# Patient Record
Sex: FEMALE | Race: BLACK OR AFRICAN AMERICAN | NOT HISPANIC OR LATINO | Employment: PART TIME | URBAN - METROPOLITAN AREA
[De-identification: names, ages, dates, MRNs, and addresses within clinical notes are randomized per-mention and may not be internally consistent; named-entity substitution may affect disease eponyms.]

---

## 2023-09-14 ENCOUNTER — HOSPITAL ENCOUNTER (EMERGENCY)
Facility: HOSPITAL | Age: 68
Discharge: HOME | End: 2023-09-14
Attending: EMERGENCY MEDICINE
Payer: COMMERCIAL

## 2023-09-14 VITALS
WEIGHT: 177 LBS | HEART RATE: 82 BPM | HEIGHT: 60 IN | SYSTOLIC BLOOD PRESSURE: 176 MMHG | RESPIRATION RATE: 18 BRPM | DIASTOLIC BLOOD PRESSURE: 105 MMHG | BODY MASS INDEX: 34.75 KG/M2 | TEMPERATURE: 98.4 F | OXYGEN SATURATION: 97 %

## 2023-09-14 DIAGNOSIS — T14.8XXA ABRASION: Primary | ICD-10-CM

## 2023-09-14 DIAGNOSIS — S13.9XXA NECK SPRAIN, INITIAL ENCOUNTER: ICD-10-CM

## 2023-09-14 PROCEDURE — 99283 EMERGENCY DEPT VISIT LOW MDM: CPT

## 2023-09-14 RX ORDER — ACETAMINOPHEN 325 MG/1
650 TABLET ORAL ONCE
Status: COMPLETED | OUTPATIENT
Start: 2023-09-14 | End: 2023-09-14

## 2023-09-14 RX ADMIN — ACETAMINOPHEN 650 MG: 325 TABLET, FILM COATED ORAL at 17:05
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
## 2024-02-18 ENCOUNTER — APPOINTMENT (OUTPATIENT)
Dept: GENERAL RADIOLOGY | Facility: HOSPITAL | Age: 69
End: 2024-02-18
Payer: MEDICARE

## 2024-02-18 ENCOUNTER — HOSPITAL ENCOUNTER (OUTPATIENT)
Facility: HOSPITAL | Age: 69
Setting detail: OBSERVATION
Discharge: HOME | End: 2024-02-21
Attending: EMERGENCY MEDICINE | Admitting: INTERNAL MEDICINE
Payer: MEDICARE

## 2024-02-18 DIAGNOSIS — U07.1 COVID-19 VIRUS INFECTION: Primary | ICD-10-CM

## 2024-02-18 DIAGNOSIS — J18.9 PNEUMONIA OF LEFT LOWER LOBE DUE TO INFECTIOUS ORGANISM: ICD-10-CM

## 2024-02-18 DIAGNOSIS — R53.1 GENERALIZED WEAKNESS: ICD-10-CM

## 2024-02-18 DIAGNOSIS — R26.2 UNABLE TO AMBULATE: ICD-10-CM

## 2024-02-18 LAB
ALBUMIN SERPL-MCNC: 3.6 G/DL (ref 3.5–5.2)
ALBUMIN/GLOB SERPL: 1 G/DL
ALP SERPL-CCNC: 116 U/L (ref 39–117)
ALT SERPL W P-5'-P-CCNC: 10 U/L (ref 1–33)
ANION GAP SERPL CALCULATED.3IONS-SCNC: 10 MMOL/L (ref 5–15)
AST SERPL-CCNC: 18 U/L (ref 1–32)
BASOPHILS # BLD AUTO: 0.02 10*3/MM3 (ref 0–0.2)
BASOPHILS NFR BLD AUTO: 0.3 % (ref 0–1.5)
BILIRUB SERPL-MCNC: 0.2 MG/DL (ref 0–1.2)
BUN SERPL-MCNC: 12 MG/DL (ref 8–23)
BUN/CREAT SERPL: 15.8 (ref 7–25)
CALCIUM SPEC-SCNC: 8.9 MG/DL (ref 8.6–10.5)
CHLORIDE SERPL-SCNC: 104 MMOL/L (ref 98–107)
CO2 SERPL-SCNC: 23 MMOL/L (ref 22–29)
CREAT SERPL-MCNC: 0.76 MG/DL (ref 0.57–1)
D-LACTATE SERPL-SCNC: 1.4 MMOL/L (ref 0.5–2)
DEPRECATED RDW RBC AUTO: 43.8 FL (ref 37–54)
EGFRCR SERPLBLD CKD-EPI 2021: 84.4 ML/MIN/1.73
EOSINOPHIL # BLD AUTO: 0.01 10*3/MM3 (ref 0–0.4)
EOSINOPHIL NFR BLD AUTO: 0.2 % (ref 0.3–6.2)
ERYTHROCYTE [DISTWIDTH] IN BLOOD BY AUTOMATED COUNT: 13.1 % (ref 12.3–15.4)
FLUAV RNA RESP QL NAA+PROBE: NOT DETECTED
FLUBV RNA RESP QL NAA+PROBE: NOT DETECTED
GLOBULIN UR ELPH-MCNC: 3.6 GM/DL
GLUCOSE SERPL-MCNC: 137 MG/DL (ref 65–99)
HCT VFR BLD AUTO: 41.9 % (ref 34–46.6)
HGB BLD-MCNC: 13.2 G/DL (ref 12–15.9)
HOLD SPECIMEN: NORMAL
HOLD SPECIMEN: NORMAL
IMM GRANULOCYTES # BLD AUTO: 0.01 10*3/MM3 (ref 0–0.05)
IMM GRANULOCYTES NFR BLD AUTO: 0.2 % (ref 0–0.5)
LYMPHOCYTES # BLD AUTO: 0.72 10*3/MM3 (ref 0.7–3.1)
LYMPHOCYTES NFR BLD AUTO: 12.3 % (ref 19.6–45.3)
MAGNESIUM SERPL-MCNC: 1.8 MG/DL (ref 1.6–2.4)
MCH RBC QN AUTO: 28.8 PG (ref 26.6–33)
MCHC RBC AUTO-ENTMCNC: 31.5 G/DL (ref 31.5–35.7)
MCV RBC AUTO: 91.3 FL (ref 79–97)
MONOCYTES # BLD AUTO: 0.95 10*3/MM3 (ref 0.1–0.9)
MONOCYTES NFR BLD AUTO: 16.3 % (ref 5–12)
NEUTROPHILS NFR BLD AUTO: 4.12 10*3/MM3 (ref 1.7–7)
NEUTROPHILS NFR BLD AUTO: 70.7 % (ref 42.7–76)
NRBC BLD AUTO-RTO: 0 /100 WBC (ref 0–0.2)
PLATELET # BLD AUTO: 258 10*3/MM3 (ref 140–450)
PMV BLD AUTO: 9.8 FL (ref 6–12)
POTASSIUM SERPL-SCNC: 3.9 MMOL/L (ref 3.5–5.2)
PROCALCITONIN SERPL-MCNC: 0.05 NG/ML (ref 0–0.25)
PROT SERPL-MCNC: 7.2 G/DL (ref 6–8.5)
RBC # BLD AUTO: 4.59 10*6/MM3 (ref 3.77–5.28)
SARS-COV-2 RNA RESP QL NAA+PROBE: DETECTED
SODIUM SERPL-SCNC: 137 MMOL/L (ref 136–145)
TROPONIN T SERPL HS-MCNC: 9 NG/L
WBC NRBC COR # BLD AUTO: 5.83 10*3/MM3 (ref 3.4–10.8)
WHOLE BLOOD HOLD COAG: NORMAL
WHOLE BLOOD HOLD SPECIMEN: NORMAL

## 2024-02-18 PROCEDURE — 84145 PROCALCITONIN (PCT): CPT | Performed by: EMERGENCY MEDICINE

## 2024-02-18 PROCEDURE — 25010000002 CEFTRIAXONE PER 250 MG: Performed by: EMERGENCY MEDICINE

## 2024-02-18 PROCEDURE — 96365 THER/PROPH/DIAG IV INF INIT: CPT

## 2024-02-18 PROCEDURE — 71045 X-RAY EXAM CHEST 1 VIEW: CPT

## 2024-02-18 PROCEDURE — 83735 ASSAY OF MAGNESIUM: CPT

## 2024-02-18 PROCEDURE — 87086 URINE CULTURE/COLONY COUNT: CPT | Performed by: NURSE PRACTITIONER

## 2024-02-18 PROCEDURE — 36415 COLL VENOUS BLD VENIPUNCTURE: CPT

## 2024-02-18 PROCEDURE — 87636 SARSCOV2 & INF A&B AMP PRB: CPT | Performed by: EMERGENCY MEDICINE

## 2024-02-18 PROCEDURE — 84484 ASSAY OF TROPONIN QUANT: CPT

## 2024-02-18 PROCEDURE — 85025 COMPLETE CBC W/AUTO DIFF WBC: CPT

## 2024-02-18 PROCEDURE — 99285 EMERGENCY DEPT VISIT HI MDM: CPT

## 2024-02-18 PROCEDURE — 80053 COMPREHEN METABOLIC PANEL: CPT

## 2024-02-18 PROCEDURE — 81001 URINALYSIS AUTO W/SCOPE: CPT

## 2024-02-18 PROCEDURE — 83605 ASSAY OF LACTIC ACID: CPT | Performed by: EMERGENCY MEDICINE

## 2024-02-18 PROCEDURE — 87040 BLOOD CULTURE FOR BACTERIA: CPT | Performed by: EMERGENCY MEDICINE

## 2024-02-18 PROCEDURE — 93005 ELECTROCARDIOGRAM TRACING: CPT

## 2024-02-18 RX ORDER — ACETAMINOPHEN 500 MG
1000 TABLET ORAL ONCE
Status: COMPLETED | OUTPATIENT
Start: 2024-02-18 | End: 2024-02-18

## 2024-02-18 RX ORDER — SODIUM CHLORIDE 0.9 % (FLUSH) 0.9 %
10 SYRINGE (ML) INJECTION AS NEEDED
Status: DISCONTINUED | OUTPATIENT
Start: 2024-02-18 | End: 2024-02-21 | Stop reason: HOSPADM

## 2024-02-18 RX ADMIN — ACETAMINOPHEN 1000 MG: 500 TABLET ORAL at 23:16

## 2024-02-18 RX ADMIN — CEFTRIAXONE 2000 MG: 2 INJECTION, POWDER, FOR SOLUTION INTRAMUSCULAR; INTRAVENOUS at 23:16

## 2024-02-18 NOTE — Clinical Note
Level of Care: Telemetry [5]   Diagnosis: COVID-19 [[id number]]   Admitting Physician: KIRAN ROSENTHAL III [[id number]]   Attending Physician: KIRAN ROSENTHAL III [[id number]]   Bed Request Comments: tele obs (not CDU)

## 2024-02-19 PROBLEM — E11.9 DMII (DIABETES MELLITUS, TYPE 2): Status: ACTIVE | Noted: 2024-02-19

## 2024-02-19 PROBLEM — I10 HTN (HYPERTENSION): Status: ACTIVE | Noted: 2024-02-19

## 2024-02-19 PROBLEM — U07.1 COVID-19: Status: ACTIVE | Noted: 2024-02-19

## 2024-02-19 LAB
ALBUMIN SERPL-MCNC: 3.6 G/DL (ref 3.5–5.2)
ALBUMIN/GLOB SERPL: 1.1 G/DL
ALP SERPL-CCNC: 111 U/L (ref 39–117)
ALT SERPL W P-5'-P-CCNC: 8 U/L (ref 1–33)
ANION GAP SERPL CALCULATED.3IONS-SCNC: 10 MMOL/L (ref 5–15)
AST SERPL-CCNC: 13 U/L (ref 1–32)
BACTERIA UR QL AUTO: ABNORMAL /HPF
BASOPHILS # BLD AUTO: 0.02 10*3/MM3 (ref 0–0.2)
BASOPHILS NFR BLD AUTO: 0.4 % (ref 0–1.5)
BILIRUB SERPL-MCNC: 0.2 MG/DL (ref 0–1.2)
BILIRUB UR QL STRIP: NEGATIVE
BUN SERPL-MCNC: 10 MG/DL (ref 8–23)
BUN/CREAT SERPL: 12 (ref 7–25)
CALCIUM SPEC-SCNC: 8.8 MG/DL (ref 8.6–10.5)
CHLORIDE SERPL-SCNC: 104 MMOL/L (ref 98–107)
CLARITY UR: ABNORMAL
CO2 SERPL-SCNC: 22 MMOL/L (ref 22–29)
COLOR UR: YELLOW
CREAT SERPL-MCNC: 0.83 MG/DL (ref 0.57–1)
CRP SERPL-MCNC: 1.83 MG/DL (ref 0–0.5)
D DIMER PPP FEU-MCNC: 0.55 MCGFEU/ML (ref 0–0.7)
DEPRECATED RDW RBC AUTO: 44.6 FL (ref 37–54)
EGFRCR SERPLBLD CKD-EPI 2021: 75.9 ML/MIN/1.73
EOSINOPHIL # BLD AUTO: 0 10*3/MM3 (ref 0–0.4)
EOSINOPHIL NFR BLD AUTO: 0 % (ref 0.3–6.2)
ERYTHROCYTE [DISTWIDTH] IN BLOOD BY AUTOMATED COUNT: 13.2 % (ref 12.3–15.4)
FERRITIN SERPL-MCNC: 67.13 NG/ML (ref 13–150)
GLOBULIN UR ELPH-MCNC: 3.3 GM/DL
GLUCOSE BLDC GLUCOMTR-MCNC: 109 MG/DL (ref 70–130)
GLUCOSE BLDC GLUCOMTR-MCNC: 120 MG/DL (ref 70–130)
GLUCOSE BLDC GLUCOMTR-MCNC: 152 MG/DL (ref 70–130)
GLUCOSE BLDC GLUCOMTR-MCNC: 153 MG/DL (ref 70–130)
GLUCOSE SERPL-MCNC: 112 MG/DL (ref 65–99)
GLUCOSE UR STRIP-MCNC: NEGATIVE MG/DL
HCT VFR BLD AUTO: 39.8 % (ref 34–46.6)
HGB BLD-MCNC: 13.2 G/DL (ref 12–15.9)
HGB UR QL STRIP.AUTO: ABNORMAL
HOLD SPECIMEN: NORMAL
HYALINE CASTS UR QL AUTO: ABNORMAL /LPF
IMM GRANULOCYTES # BLD AUTO: 0.01 10*3/MM3 (ref 0–0.05)
IMM GRANULOCYTES NFR BLD AUTO: 0.2 % (ref 0–0.5)
KETONES UR QL STRIP: NEGATIVE
LDH SERPL-CCNC: 162 U/L (ref 135–214)
LEUKOCYTE ESTERASE UR QL STRIP.AUTO: ABNORMAL
LYMPHOCYTES # BLD AUTO: 1.32 10*3/MM3 (ref 0.7–3.1)
LYMPHOCYTES NFR BLD AUTO: 28.3 % (ref 19.6–45.3)
MAGNESIUM SERPL-MCNC: 2 MG/DL (ref 1.6–2.4)
MCH RBC QN AUTO: 30.3 PG (ref 26.6–33)
MCHC RBC AUTO-ENTMCNC: 33.2 G/DL (ref 31.5–35.7)
MCV RBC AUTO: 91.5 FL (ref 79–97)
MONOCYTES # BLD AUTO: 0.96 10*3/MM3 (ref 0.1–0.9)
MONOCYTES NFR BLD AUTO: 20.6 % (ref 5–12)
NEUTROPHILS NFR BLD AUTO: 2.35 10*3/MM3 (ref 1.7–7)
NEUTROPHILS NFR BLD AUTO: 50.5 % (ref 42.7–76)
NITRITE UR QL STRIP: NEGATIVE
NRBC BLD AUTO-RTO: 0 /100 WBC (ref 0–0.2)
PH UR STRIP.AUTO: 5.5 [PH] (ref 5–8)
PLAT MORPH BLD: NORMAL
PLATELET # BLD AUTO: 237 10*3/MM3 (ref 140–450)
PMV BLD AUTO: 10.3 FL (ref 6–12)
POTASSIUM SERPL-SCNC: 3.8 MMOL/L (ref 3.5–5.2)
PROT SERPL-MCNC: 6.9 G/DL (ref 6–8.5)
PROT UR QL STRIP: NEGATIVE
RBC # BLD AUTO: 4.35 10*6/MM3 (ref 3.77–5.28)
RBC # UR STRIP: ABNORMAL /HPF
RBC MORPH BLD: NORMAL
REF LAB TEST METHOD: ABNORMAL
SODIUM SERPL-SCNC: 136 MMOL/L (ref 136–145)
SP GR UR STRIP: 1.02 (ref 1–1.03)
SQUAMOUS #/AREA URNS HPF: ABNORMAL /HPF
UROBILINOGEN UR QL STRIP: ABNORMAL
WBC # UR STRIP: ABNORMAL /HPF
WBC MORPH BLD: NORMAL
WBC NRBC COR # BLD AUTO: 4.66 10*3/MM3 (ref 3.4–10.8)

## 2024-02-19 PROCEDURE — 97530 THERAPEUTIC ACTIVITIES: CPT

## 2024-02-19 PROCEDURE — 82728 ASSAY OF FERRITIN: CPT | Performed by: INTERNAL MEDICINE

## 2024-02-19 PROCEDURE — G0378 HOSPITAL OBSERVATION PER HR: HCPCS

## 2024-02-19 PROCEDURE — 25010000002 AZITHROMYCIN PER 500 MG: Performed by: INTERNAL MEDICINE

## 2024-02-19 PROCEDURE — 25010000002 HEPARIN (PORCINE) PER 1000 UNITS: Performed by: INTERNAL MEDICINE

## 2024-02-19 PROCEDURE — 83615 LACTATE (LD) (LDH) ENZYME: CPT | Performed by: INTERNAL MEDICINE

## 2024-02-19 PROCEDURE — 96361 HYDRATE IV INFUSION ADD-ON: CPT

## 2024-02-19 PROCEDURE — 63710000001 INSULIN LISPRO (HUMAN) PER 5 UNITS: Performed by: INTERNAL MEDICINE

## 2024-02-19 PROCEDURE — 80053 COMPREHEN METABOLIC PANEL: CPT | Performed by: INTERNAL MEDICINE

## 2024-02-19 PROCEDURE — 96366 THER/PROPH/DIAG IV INF ADDON: CPT

## 2024-02-19 PROCEDURE — 96372 THER/PROPH/DIAG INJ SC/IM: CPT

## 2024-02-19 PROCEDURE — 85025 COMPLETE CBC W/AUTO DIFF WBC: CPT | Performed by: INTERNAL MEDICINE

## 2024-02-19 PROCEDURE — 85007 BL SMEAR W/DIFF WBC COUNT: CPT | Performed by: INTERNAL MEDICINE

## 2024-02-19 PROCEDURE — 25810000003 SODIUM CHLORIDE 0.9 % SOLUTION: Performed by: INTERNAL MEDICINE

## 2024-02-19 PROCEDURE — 97166 OT EVAL MOD COMPLEX 45 MIN: CPT

## 2024-02-19 PROCEDURE — 83735 ASSAY OF MAGNESIUM: CPT | Performed by: INTERNAL MEDICINE

## 2024-02-19 PROCEDURE — 25810000003 SODIUM CHLORIDE 0.9 % SOLUTION 250 ML FLEX CONT: Performed by: INTERNAL MEDICINE

## 2024-02-19 PROCEDURE — 82948 REAGENT STRIP/BLOOD GLUCOSE: CPT

## 2024-02-19 PROCEDURE — 99223 1ST HOSP IP/OBS HIGH 75: CPT | Performed by: INTERNAL MEDICINE

## 2024-02-19 PROCEDURE — 97161 PT EVAL LOW COMPLEX 20 MIN: CPT

## 2024-02-19 PROCEDURE — 25010000002 CEFTRIAXONE PER 250 MG: Performed by: INTERNAL MEDICINE

## 2024-02-19 PROCEDURE — 86140 C-REACTIVE PROTEIN: CPT | Performed by: INTERNAL MEDICINE

## 2024-02-19 PROCEDURE — 85379 FIBRIN DEGRADATION QUANT: CPT | Performed by: INTERNAL MEDICINE

## 2024-02-19 RX ORDER — SODIUM CHLORIDE 0.9 % (FLUSH) 0.9 %
10 SYRINGE (ML) INJECTION EVERY 12 HOURS SCHEDULED
Status: DISCONTINUED | OUTPATIENT
Start: 2024-02-19 | End: 2024-02-21 | Stop reason: HOSPADM

## 2024-02-19 RX ORDER — SODIUM CHLORIDE 9 MG/ML
40 INJECTION, SOLUTION INTRAVENOUS AS NEEDED
Status: DISCONTINUED | OUTPATIENT
Start: 2024-02-19 | End: 2024-02-21 | Stop reason: HOSPADM

## 2024-02-19 RX ORDER — IBUPROFEN 600 MG/1
1 TABLET ORAL
Status: DISCONTINUED | OUTPATIENT
Start: 2024-02-19 | End: 2024-02-21 | Stop reason: HOSPADM

## 2024-02-19 RX ORDER — ACETAMINOPHEN 325 MG/1
650 TABLET ORAL EVERY 4 HOURS PRN
Status: DISCONTINUED | OUTPATIENT
Start: 2024-02-19 | End: 2024-02-21 | Stop reason: HOSPADM

## 2024-02-19 RX ORDER — ONDANSETRON 2 MG/ML
4 INJECTION INTRAMUSCULAR; INTRAVENOUS EVERY 6 HOURS PRN
Status: DISCONTINUED | OUTPATIENT
Start: 2024-02-19 | End: 2024-02-21 | Stop reason: HOSPADM

## 2024-02-19 RX ORDER — LISINOPRIL 5 MG/1
5 TABLET ORAL DAILY
COMMUNITY
End: 2024-02-21 | Stop reason: HOSPADM

## 2024-02-19 RX ORDER — SODIUM CHLORIDE 0.9 % (FLUSH) 0.9 %
10 SYRINGE (ML) INJECTION AS NEEDED
Status: DISCONTINUED | OUTPATIENT
Start: 2024-02-19 | End: 2024-02-21 | Stop reason: HOSPADM

## 2024-02-19 RX ORDER — CHOLECALCIFEROL (VITAMIN D3) 125 MCG
5 CAPSULE ORAL NIGHTLY PRN
Status: DISCONTINUED | OUTPATIENT
Start: 2024-02-19 | End: 2024-02-21 | Stop reason: HOSPADM

## 2024-02-19 RX ORDER — HYDROCODONE BITARTRATE AND ACETAMINOPHEN 5; 325 MG/1; MG/1
1 TABLET ORAL EVERY 4 HOURS PRN
Status: DISCONTINUED | OUTPATIENT
Start: 2024-02-19 | End: 2024-02-19

## 2024-02-19 RX ORDER — NITROGLYCERIN 0.4 MG/1
0.4 TABLET SUBLINGUAL
Status: DISCONTINUED | OUTPATIENT
Start: 2024-02-19 | End: 2024-02-21 | Stop reason: HOSPADM

## 2024-02-19 RX ORDER — DEXTROSE MONOHYDRATE 25 G/50ML
25 INJECTION, SOLUTION INTRAVENOUS
Status: DISCONTINUED | OUTPATIENT
Start: 2024-02-19 | End: 2024-02-21 | Stop reason: HOSPADM

## 2024-02-19 RX ORDER — NICOTINE POLACRILEX 4 MG
15 LOZENGE BUCCAL
Status: DISCONTINUED | OUTPATIENT
Start: 2024-02-19 | End: 2024-02-21 | Stop reason: HOSPADM

## 2024-02-19 RX ORDER — ALBUTEROL SULFATE 2.5 MG/3ML
2.5 SOLUTION RESPIRATORY (INHALATION) EVERY 6 HOURS PRN
Status: DISCONTINUED | OUTPATIENT
Start: 2024-02-19 | End: 2024-02-21 | Stop reason: HOSPADM

## 2024-02-19 RX ORDER — HEPARIN SODIUM 5000 [USP'U]/ML
5000 INJECTION, SOLUTION INTRAVENOUS; SUBCUTANEOUS EVERY 8 HOURS SCHEDULED
Status: DISCONTINUED | OUTPATIENT
Start: 2024-02-19 | End: 2024-02-21 | Stop reason: HOSPADM

## 2024-02-19 RX ORDER — INSULIN LISPRO 100 [IU]/ML
2-7 INJECTION, SOLUTION INTRAVENOUS; SUBCUTANEOUS
Status: DISCONTINUED | OUTPATIENT
Start: 2024-02-19 | End: 2024-02-21 | Stop reason: HOSPADM

## 2024-02-19 RX ORDER — LISINOPRIL 5 MG/1
5 TABLET ORAL DAILY
Status: DISCONTINUED | OUTPATIENT
Start: 2024-02-19 | End: 2024-02-20

## 2024-02-19 RX ORDER — SODIUM CHLORIDE 9 MG/ML
75 INJECTION, SOLUTION INTRAVENOUS CONTINUOUS
Status: DISCONTINUED | OUTPATIENT
Start: 2024-02-19 | End: 2024-02-20

## 2024-02-19 RX ADMIN — ACETAMINOPHEN 650 MG: 325 TABLET ORAL at 11:56

## 2024-02-19 RX ADMIN — Medication 10 ML: at 03:06

## 2024-02-19 RX ADMIN — HEPARIN SODIUM 5000 UNITS: 5000 INJECTION INTRAVENOUS; SUBCUTANEOUS at 05:26

## 2024-02-19 RX ADMIN — HEPARIN SODIUM 5000 UNITS: 5000 INJECTION INTRAVENOUS; SUBCUTANEOUS at 21:03

## 2024-02-19 RX ADMIN — SODIUM CHLORIDE 75 ML/HR: 9 INJECTION, SOLUTION INTRAVENOUS at 17:01

## 2024-02-19 RX ADMIN — LISINOPRIL 5 MG: 5 TABLET ORAL at 07:40

## 2024-02-19 RX ADMIN — SODIUM CHLORIDE 75 ML/HR: 9 INJECTION, SOLUTION INTRAVENOUS at 03:06

## 2024-02-19 RX ADMIN — INSULIN LISPRO 2 UNITS: 100 INJECTION, SOLUTION INTRAVENOUS; SUBCUTANEOUS at 17:01

## 2024-02-19 RX ADMIN — INSULIN LISPRO 2 UNITS: 100 INJECTION, SOLUTION INTRAVENOUS; SUBCUTANEOUS at 11:30

## 2024-02-19 RX ADMIN — HEPARIN SODIUM 5000 UNITS: 5000 INJECTION INTRAVENOUS; SUBCUTANEOUS at 13:19

## 2024-02-19 RX ADMIN — AZITHROMYCIN 500 MG: 500 INJECTION, POWDER, LYOPHILIZED, FOR SOLUTION INTRAVENOUS at 05:25

## 2024-02-19 RX ADMIN — Medication 10 ML: at 21:03

## 2024-02-19 RX ADMIN — CEFTRIAXONE 2000 MG: 2 INJECTION, POWDER, FOR SOLUTION INTRAMUSCULAR; INTRAVENOUS at 21:03

## 2024-02-19 NOTE — THERAPY EVALUATION
Patient Name: Lizbet Flores  : 1955    MRN: [id number]                              Today's Date: 2024       Admit Date: 2024    Visit Dx:     ICD-10-CM ICD-9-CM   1. COVID-19 virus infection  U07.1 079.89   2. Pneumonia of left lower lobe due to infectious organism  J18.9 486   3. Generalized weakness  R53.1 780.79   4. Unable to ambulate  R26.2 719.7     Patient Active Problem List   Diagnosis    COVID-19    HTN (hypertension)    DMII (diabetes mellitus, type 2)     Past Medical History:   Diagnosis Date    Hypertension      History reviewed. No pertinent surgical history.   General Information       Row Name 24 1411          Physical Therapy Time and Intention    Document Type evaluation  -ML     Mode of Treatment physical therapy  -ML       Row Name 24 1411          General Information    Patient Profile Reviewed yes  -ML     Prior Level of Function independent:;all household mobility;community mobility;gait;transfer;bed mobility;ADL's;home management;driving;shopping  patient reports not ambulatin with AD  -ML     Existing Precautions/Restrictions fall  -ML     Barriers to Rehab none identified  -ML       Row Name 24 1411          Living Environment    People in Home spouse  patient's spouse is currently at rehab, 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  -ML       Row Name 24 1411          Home Main Entrance    Number of Stairs, Main Entrance other (see comments)  ramp  -ML       Row Name 24 1411          Stairs Within Home, Primary    Number of Stairs, Within Home, Primary none  -ML       Row Name 24 1411          Cognition    Orientation Status (Cognition) oriented x 3  -ML       Row Name 24 1411          Safety Issues, Functional Mobility    Safety Issues Affecting Function (Mobility) awareness of need for assistance;insight into deficits/self-awareness;safety precaution awareness;safety precautions follow-through/compliance;sequencing abilities   -ML     Impairments Affecting Function (Mobility) balance;endurance/activity tolerance;strength  -ML               User Key  (r) = Recorded By, (t) = Taken By, (c) = Cosigned By      Initials Name Provider Type    Aruna Martínez Physical Therapist                   Mobility       Row Name 02/19/24 1413          Bed Mobility    Bed Mobility supine-sit  -ML     Supine-Sit [place] (Bed Mobility) standby assist  -ML     Assistive Device (Bed Mobility) bed rails;head of bed elevated  -ML       Row Name 02/19/24 1413          Bed-Chair Transfer    Bed-Chair [place] (Transfers) contact guard  -ML     Assistive Device (Bed-Chair Transfers) other (see comments)  no AD  -ML     Comment, (Bed-Chair Transfer) patient completed stand pivot transfer from chair to BSC, BSC to chair  -ML       Row Name 02/19/24 1413          Sit-Stand Transfer    Sit-Stand [place] (Transfers) contact guard  -ML     Assistive Device (Sit-Stand Transfers) other (see comments)  no AD  -ML       Row Name 02/19/24 1413          Gait/Stairs (Locomotion)    [place] Level (Gait) contact guard;verbal cues  hand held assist  -ML     Assistive Device (Gait) other (see comments)  hand held assist  -ML     Distance in Feet (Gait) 5  -ML     Deviations/Abnormal Patterns (Gait) bilateral deviations;adam decreased;festinating/shuffling;gait speed decreased;stride length decreased;weight shifting decreased  -ML     Bilateral Gait Deviations forward flexed posture;heel strike decreased  -ML     Comment, (Gait/Stairs) Patient declined use of RW, however, reaching for bed/chair during ambulation.  -ML               User Key  (r) = Recorded By, (t) = Taken By, (c) = Cosigned By      Initials Name Provider Type    Aruna Martínez Physical Therapist                   Obj/Interventions       Row Name 02/19/24 1415          Range of Motion Comprehensive    General Range of Motion bilateral lower extremity ROM WFL  -ML       Row Name 02/19/24 1418           Strength Comprehensive (MMT)    General Manual Muscle Testing (MMT) Assessment lower extremity strength deficits identified  -ML     Comment, General Manual Muscle Testing (MMT) Assessment BLE grossly 4/5  -ML       Row Name 02/19/24 1415          Balance    Balance Assessment sitting static balance;sitting dynamic balance;sit to stand dynamic balance;standing static balance;standing dynamic balance  -ML     Static Sitting Balance standby assist  -ML     Dynamic Sitting Balance standby assist  -ML     Position, Sitting Balance unsupported;sitting edge of bed  -ML     Sit to Stand Dynamic Balance verbal cues;contact guard  -ML     Static Standing Balance contact guard  -ML     Dynamic Standing Balance contact guard  -ML     Position/Device Used, Standing Balance supported  -ML     Balance Interventions sitting;standing;sit to stand;supported;occupation based/functional task  -ML       Row Name 02/19/24 1415          Sensory Assessment (Somatosensory)    Sensory Assessment (Somatosensory) LE sensation intact  -ML               User Key  (r) = Recorded By, (t) = Taken By, (c) = Cosigned By      Initials Name Provider Type    ML Aruna Rose Physical Therapist                   Goals/Plan       Row Name 02/19/24 1423          Bed Mobility Goal 1 (PT)    Activity/Assistive Device (Bed Mobility Goal 1, PT) sit to supine;supine to sit  -ML     [place] Level/Cues Needed (Bed Mobility Goal 1, PT) independent  -ML     Time Frame (Bed Mobility Goal 1, PT) 10 days  -ML       Row Name 02/19/24 1423          Transfer Goal 1 (PT)    Activity/Assistive Device (Transfer Goal 1, PT) sit-to-stand/stand-to-sit;bed-to-chair/chair-to-bed;walker, rolling  -ML     [place] Level/Cues Needed (Transfer Goal 1, PT) modified independence  -ML     Time Frame (Transfer Goal 1, PT) 10 days  -ML       Row Name 02/19/24 1423          Gait Training Goal 1 (PT)    Activity/Assistive Device (Gait Training Goal 1, PT) gait (walking  locomotion);assistive device use;decrease fall risk;improve balance and speed;increase endurance/gait distance;walker, rolling  -ML     [place] Level (Gait Training Goal 1, PT) modified independence  -ML     Distance (Gait Training Goal 1, PT) 100  -ML     Time Frame (Gait Training Goal 1, PT) 10 days  -       Row Name 02/19/24 1423          Therapy Assessment/Plan (PT)    Planned Therapy Interventions (PT) balance training;bed mobility training;gait training;home exercise program;neuromuscular re-education;patient/family education;postural re-education;ROM (range of motion);strengthening;stretching;transfer training  -               User Key  (r) = Recorded By, (t) = Taken By, (c) = Cosigned By      Initials Name Provider Type     Aruna Rose Physical Therapist                   Clinical Impression       [facility] Name 02/19/24 1421          Pain    Pretreatment Pain Rating 0/10 - no pain  -ML     Posttreatment Pain Rating 0/10 - no pain  -ML       Row Name 02/19/24 1421          Plan of Care Review    Plan of Care Reviewed With patient  -     Outcome Evaluation Patient ambulates with shuffled gait, reaching for furniture, patient declined use of RW despite education. Patient currently presents below baseline for mobility and would continue to benefit from skilled PT to address strength, balance and activity tolerance deficits.  -       Row Name 02/19/24 1421          Therapy Assessment/Plan (PT)    Rehab Potential (PT) good, to achieve stated therapy goals  -     Criteria for Skilled Interventions Met (PT) yes;meets criteria;skilled treatment is necessary  -ML     Therapy Frequency (PT) daily  -ML       Row Name 02/19/24 1421          Vital Signs    Pre Patient Position Supine  -ML     Intra Patient Position Standing  -ML     Post Patient Position Sitting  -ML       Row Name 02/19/24 1421          Positioning and Restraints    Pre-Treatment Position in bed  -ML     Post Treatment Position chair  -ML      In Chair notified nsg;reclined;call light within reach;encouraged to call for assist;exit alarm on;waffle cushion;legs elevated  -ML               User Key  (r) = Recorded By, (t) = Taken By, (c) = Cosigned By      Initials Name Provider Type    Aruna Martínez Physical Therapist                   Outcome Measures       Row Name 02/19/24 1423          How much help from another person do you currently need...    Turning from your back to your side while in flat bed without using bedrails? 3  -ML     Moving from lying on back to sitting on the side of a flat bed without bedrails? 3  -ML     Moving to and from a bed to a chair (including a wheelchair)? 3  -ML     Standing up from a chair using your arms (e.g., wheelchair, bedside chair)? 3  -ML     Climbing 3-5 steps with a railing? 3  -ML     To walk in hospital room? 3  -ML     AM-PAC 6 Clicks Score (PT) 18  -ML     Highest Level of Mobility Goal 6 --> Walk 10 steps or more  -ML       Row Name 02/19/24 1423 02/19/24 0906       Functional Assessment    Outcome Measure Options AM-PAC 6 Clicks Basic Mobility (PT)  -ML AM-PAC 6 Clicks Daily Activity (OT)  -AF              User Key  (r) = Recorded By, (t) = Taken By, (c) = Cosigned By      Initials Name Provider Type    Aruna Martínez Physical Therapist    AF Isis Spencer OT Occupational Therapist                                 Physical Therapy Education       Title: PT OT SLP Therapies (In Progress)       Topic: Physical Therapy (In Progress)       Point: Mobility training (Done)       Learning Progress Summary             Patient Acceptance, E, VU,NR by  at 2/19/2024 1424                         Point: Home exercise program (Not Started)       Learner Progress:  Not documented in this visit.              Point: Body mechanics (Done)       Learning Progress Summary             Patient Acceptance, E, VU,NR by  at 2/19/2024 1424                         Point: Precautions (Done)       Learning Progress Summary              Patient Acceptance, E, VU,NR by  at 2/19/2024 1424                                         User Key       Initials Effective Dates Name Provider Type Discipline     04/22/21 -  Aruna Rose Physical Therapist PT                  PT Recommendation and Plan  Planned Therapy Interventions (PT): balance training, bed mobility training, gait training, home exercise program, neuromuscular re-education, patient/family education, postural re-education, ROM (range of motion), strengthening, stretching, transfer training  Plan of Care Reviewed With: patient  Outcome Evaluation: Patient ambulates with shuffled gait, reaching for furniture, patient declined use of RW despite education. Patient currently presents below baseline for mobility and would continue to benefit from skilled PT to address strength, balance and activity tolerance deficits.     Time Calculation:   PT Evaluation Complexity  History, PT Evaluation Complexity: 1-2 personal factors and/or comorbidities  Examination of Body Systems (PT Eval Complexity): 1-2 elements  Clinical Presentation (PT Evaluation Complexity): evolving  Clinical Decision Making (PT Evaluation Complexity): low complexity  Overall Complexity (PT Evaluation Complexity): low complexity     PT Charges       Row Name 02/19/24 1425             Time Calculation    Start Time 1326  -ML      PT Received On 02/19/24  -ML      PT Goal Re-Cert Due Date 02/29/24  -ML         Timed Charges    59753 - PT Therapeutic Activity Minutes 10  -ML         Untimed Charges    PT Eval/Re-eval Minutes 46  -ML         Total Minutes    Timed Charges Total Minutes 10  -ML      Untimed Charges Total Minutes 46  -ML       Total Minutes 56  -ML                User Key  (r) = Recorded By, (t) = Taken By, (c) = Cosigned By      Initials Name Provider Type     Aruna Rose Physical Therapist                  Therapy Charges for Today       Code Description Service Date Service Provider Modifiers Qty     25770332793 HC PT THERAPEUTIC ACT EA 15 MIN 2/19/2024 Aruna Rose GP 1    02946959665 HC PT EVAL LOW COMPLEXITY 4 2/19/2024 Aruna Rose GP 1            PT G-Codes  Outcome Measure Options: AM-PAC 6 Clicks Basic Mobility (PT)  AM-PAC 6 Clicks Score (PT): 18  AM-PAC 6 Clicks Score (OT): 15  PT Discharge Summary  Anticipated Discharge Disposition (PT): inpatient rehabilitation facility    Aruna Rose  2/19/2024

## 2024-02-19 NOTE — CASE MANAGEMENT/SOCIAL WORK
Discharge Planning Assessment  [facility]     Patient Name: Lizbet Flores  MRN: [id number]  Today's Date: 2/19/2024    Admit Date: 2/18/2024    Plan: home/family   Discharge Needs Assessment       Row Name 02/19/24 1402       Living Environment    People in Home spouse    Current Living Arrangements home    Potentially Unsafe Housing Conditions none    Primary Care Provided by self    Provides Primary Care For no one    Family Caregiver if Needed child(johnny), adult;spouse    Quality of Family Relationships helpful;involved;supportive    Able to Return to Prior Arrangements yes    Living Arrangement Comments Plan is home       Resource/Environmental Concerns    Resource/Environmental Concerns none    Transportation Concerns none       Transition Planning    Patient/Family Anticipates Transition to home with family    Patient/Family Anticipated Services at Transition none    Transportation Anticipated family or friend will provide       Discharge Needs Assessment    Equipment Currently Used at Home none    Concerns to be Addressed no discharge needs identified    Anticipated Changes Related to Illness none    Equipment Needed After Discharge none    Discharge Coordination/Progress Plan home                   Discharge Plan       Row Name 02/19/24 1404       Plan    Plan home/family    Patient/Family in Agreement with Plan yes    Plan Comments IDP done by talking with patient's LEONARDO Daily. [place] is a  here. Patient lives in [place] with spouse and adult stepson. Independent, no dme, no HH and no home 02. Plan is home at d/c and has good family support and transportation home.    Final Discharge Disposition Code 01 - home or self-care                  Continued Care and Services - Admitted Since 2/18/2024    Coordination has not been started for this encounter.       Expected Discharge Date and Time       Expected Discharge Date Expected Discharge Time    Feb 20, 2024            Demographic Summary        Row Name 02/19/24 1400       General Information    Admission Type observation    Referral Source admission list    Reason for Consult discharge planning    Preferred Language English       Contact Information    Permission Granted to Share Info With     Contact Information Obtained for     Contact Information Comments PCP: Sonny Guzman                   Functional Status       Row Name 02/19/24 1401       Functional Status    Usual Activity Tolerance good    Current Activity Tolerance moderate       Functional Status, IADL    Medications independent    Meal Preparation independent    Housekeeping independent    Laundry independent    Shopping independent    IADL Comments Patient has coverage for medications       Mental Status Summary    Recent Changes in Mental Status/Cognitive Functioning unable to assess                   Psychosocial    No documentation.                  Abuse/Neglect    No documentation.                  Legal    No documentation.                  Substance Abuse    No documentation.                  Patient Forms    No documentation.                     Chantell Michel, RN

## 2024-02-19 NOTE — PLAN OF CARE
Goal Outcome Evaluation:  Plan of Care Reviewed With: patient, family           Outcome Evaluation: Pt presents with generalized weakness and decreased activity tolerance impacting ADL/IADL and fxnl mobility indp. Pt blood pressure 196/98 when sitting EOB, nsg notified and pt return to bed. Blood pressure 188/99 once return to bed, nsg notified. Rec. IPOT to address deficits and support return to baseline. Rec. SNF upon d/c, will monitor closely.      Anticipated Discharge Disposition (OT): skilled nursing facility

## 2024-02-19 NOTE — PLAN OF CARE
Goal Outcome Evaluation:  Plan of Care Reviewed With: patient        Progress: no change  Outcome Evaluation: VSS. Pt BP decreasing with dose of lisinopril. RA. NSR. No pain. AXO4. Continue POC.

## 2024-02-19 NOTE — ED PROVIDER NOTES
[facility]    EMERGENCY DEPARTMENT ENCOUNTER      Pt Name: Lizbet Flores  MRN: [id number]  YOB: 1955  Date of evaluation: 2/18/2024  Provider: Cr Castillo DO    CHIEF COMPLAINT       Chief Complaint   Patient presents with    Weakness - Generalized    Headache         HISTORY OF PRESENT ILLNESS  (Location/Symptom, Timing/Onset, Context/Setting, Quality, Duration, Modifying Factors, Severity.)   Lizbet Flores is a 69 y.o. female who presents to the emergency department from home via EMS with concern for increasing weakness, headache, chills, just overall not feeling well since this afternoon.  She notes she was able to get up and go to [employer] this morning, she got home she just felt tired, took a nap, when she awoke this afternoon was very weak, was unable to get up, get out of bed secondary to the weakness, generalized headache, very mild cough without sputum production.  She has mild chills, low-grade fever.  No known sick contacts, no recent travel.  She has not been in the hospital recently.  She has increased urinary frequency without dysuria.  At baseline she is able to ambulate without need for assistance.  Denies any other acute systemic complaints this time.      Nursing notes were reviewed.      PAST MEDICAL HISTORY   No past medical history on file.      SURGICAL HISTORY     No past surgical history on file.      CURRENT MEDICATIONS       Current Facility-Administered Medications:     sodium chloride 0.9 % flush 10 mL, 10 mL, Intravenous, PRN, Emergency, Triage Protocol, MD  No current outpatient medications on file.    ALLERGIES     Patient has no known allergies.    FAMILY HISTORY     No family history on file.       SOCIAL HISTORY       Social History     Socioeconomic History    Marital status:          PHYSICAL EXAM    (up to 7 for level 4, 8 or more for level 5)     Vitals:    02/18/24 2219 02/18/24 2220   BP:  (!) 183/90   BP Location:  Left arm   Patient Position:   "Sitting   Pulse: 110 108   Resp:  18   Temp:  100.3 °F (37.9 °C)   TempSrc:  Oral   SpO2: 95% 95%   Weight:  81.6 kg (180 lb)   Height:  152.4 cm (60\")       Physical Exam  General : Patient is elderly, pleasant, mild injection awake, alert, oriented, in no acute distress, nontoxic appearing  HEENT: Pupils are equally round, EOMI, conjunctivae with mild injection no icterus.  Oral mucosa is moist, uvula midline  Neck: Neck is supple, full range of motion, trachea midline  Cardiac: Heart tachycardic rate with regular rhythm, no murmurs, rubs, or gallops  Lungs: Lungs are clear to auscultation, there is no wheezing, rhonchi, or rales. There is no use of accessory muscles  Abdomen: Abdomen is soft, nontender, nondistended.  No peritoneal signs.  There are no firm or pulsatile masses, no rebound rigidity or guarding  Musculoskeletal: No peripheral edema, no focal muscle deficits are appreciated  Neuro: Motor intact, sensory intact, level of consciousness is normal, GCS 15, patient is answering questions appropriately  Dermatology: Skin is warm and dry  Psych: Mentation is grossly normal, cognition is grossly normal. Affect is appropriate      DIAGNOSTIC RESULTS     EKG:  All EKGs are interpreted by the Emergency Department Physician who either signs or Co-signs this chart in the absence of a cardiologist.    ECG 12 Lead ED Triage Standing Order; Weak / Dizzy / AMS   Preliminary Result   Test Reason : ED Triage Standing Order~   Blood Pressure :   */*   mmHG   Vent. Rate : 102 BPM     Atrial Rate : 102 BPM      P-R Int : 168 ms          QRS Dur :  84 ms       QT Int : 312 ms       P-R-T Axes :  56  33  52 degrees      QTc Int : 406 ms      Sinus tachycardia   Otherwise normal ECG   No previous ECGs available      Referred By: EDMD           Confirmed By:           RADIOLOGY:     [x] Radiologist's Report Reviewed:  XR Chest 1 View   Final Result   Impression:   Mild left lower lobe pneumonia with possible small left-sided " pleural effusion.            Electronically Signed: Celsa Hall MD     2/18/2024 10:53 PM EST     Workstation ID: [id number]          I ordered and independently reviewed the above noted radiographic studies.      I viewed images of chest x-ray which showed left lower lobe infiltrate per my independent interpretation.    See radiologist's dictation for official interpretation.      ED BEDSIDE ULTRASOUND:   Performed by ED Physician - none    LABS:    I have reviewed and interpreted all of the currently available lab results from this visit (if applicable):  Results for orders placed or performed during the hospital encounter of 02/18/24   COVID-19 and FLU A/B PCR, 1 HR TAT - Swab, Nasopharynx    Specimen: Nasopharynx; Swab   Result Value Ref Range    COVID19 Detected (C) Not Detected - Ref. Range    Influenza A PCR Not Detected Not Detected    Influenza B PCR Not Detected Not Detected   Comprehensive Metabolic Panel    Specimen: Blood   Result Value Ref Range    Glucose 137 (H) 65 - 99 mg/dL    BUN 12 8 - 23 mg/dL    Creatinine 0.76 0.57 - 1.00 mg/dL    Sodium 137 136 - 145 mmol/L    Potassium 3.9 3.5 - 5.2 mmol/L    Chloride 104 98 - 107 mmol/L    CO2 23.0 22.0 - 29.0 mmol/L    Calcium 8.9 8.6 - 10.5 mg/dL    Total Protein 7.2 6.0 - 8.5 g/dL    Albumin 3.6 3.5 - 5.2 g/dL    ALT (SGPT) 10 1 - 33 U/L    AST (SGOT) 18 1 - 32 U/L    Alkaline Phosphatase 116 39 - 117 U/L    Total Bilirubin 0.2 0.0 - 1.2 mg/dL    Globulin 3.6 gm/dL    A/G Ratio 1.0 g/dL    BUN/Creatinine Ratio 15.8 7.0 - 25.0    Anion Gap 10.0 5.0 - 15.0 mmol/L    eGFR 84.4 >60.0 mL/min/1.73   Single High Sensitivity Troponin T    Specimen: Blood   Result Value Ref Range    HS Troponin T 9 <14 ng/L   Magnesium    Specimen: Blood   Result Value Ref Range    Magnesium 1.8 1.6 - 2.4 mg/dL   Urinalysis With Microscopic If Indicated (No Culture) - Urine, Clean Catch    Specimen: Urine, Clean Catch   Result Value Ref Range    Color, UA Yellow Yellow, Straw     Appearance, UA Cloudy (A) Clear    pH, UA 5.5 5.0 - 8.0    Specific Gravity, UA 1.019 1.001 - 1.030    Glucose, UA Negative Negative    Ketones, UA Negative Negative    Bilirubin, UA Negative Negative    Blood, UA Trace (A) Negative    Protein, UA Negative Negative    Leuk Esterase, UA Trace (A) Negative    Nitrite, UA Negative Negative    Urobilinogen, UA 0.2 E.U./dL 0.2 - 1.0 E.U./dL   CBC Auto Differential    Specimen: Blood   Result Value Ref Range    WBC 5.83 3.40 - 10.80 10*3/mm3    RBC 4.59 3.77 - 5.28 10*6/mm3    Hemoglobin 13.2 12.0 - 15.9 g/dL    Hematocrit 41.9 34.0 - 46.6 %    MCV 91.3 79.0 - 97.0 fL    MCH 28.8 26.6 - 33.0 pg    MCHC 31.5 31.5 - 35.7 g/dL    RDW 13.1 12.3 - 15.4 %    RDW-SD 43.8 37.0 - 54.0 fl    MPV 9.8 6.0 - 12.0 fL    Platelets 258 140 - 450 10*3/mm3    Neutrophil % 70.7 42.7 - 76.0 %    Lymphocyte % 12.3 (L) 19.6 - 45.3 %    Monocyte % 16.3 (H) 5.0 - 12.0 %    Eosinophil % 0.2 (L) 0.3 - 6.2 %    Basophil % 0.3 0.0 - 1.5 %    Immature Grans % 0.2 0.0 - 0.5 %    Neutrophils, Absolute 4.12 1.70 - 7.00 10*3/mm3    Lymphocytes, Absolute 0.72 0.70 - 3.10 10*3/mm3    Monocytes, Absolute 0.95 (H) 0.10 - 0.90 10*3/mm3    Eosinophils, Absolute 0.01 0.00 - 0.40 10*3/mm3    Basophils, Absolute 0.02 0.00 - 0.20 10*3/mm3    Immature Grans, Absolute 0.01 0.00 - 0.05 10*3/mm3    nRBC 0.0 0.0 - 0.2 /100 WBC   Lactic Acid, Plasma    Specimen: Blood   Result Value Ref Range    Lactate 1.4 0.5 - 2.0 mmol/L   Procalcitonin    Specimen: Blood   Result Value Ref Range    Procalcitonin 0.05 0.00 - 0.25 ng/mL   ECG 12 Lead ED Triage Standing Order; Weak / Dizzy / AMS   Result Value Ref Range    QT Interval 312 ms    QTC Interval 406 ms   Green Top (Gel)   Result Value Ref Range    Extra Tube Hold for add-ons.    Lavender Top   Result Value Ref Range    Extra Tube hold for add-on    Gold Top - SST   Result Value Ref Range    Extra Tube Hold for add-ons.    Light Blue Top   Result Value Ref Range    Extra  Tube Hold for add-ons.         If labs were ordered, I independently reviewed the results and considered them in treating the patient.      EMERGENCY DEPARTMENT COURSE and DIFFERENTIAL DIAGNOSIS/MDM:   Vitals:  AS OF 00:44 EST    BP - (!) 183/90  HR - 108  TEMP - 100.3 °F (37.9 °C) (Oral)  O2 SATS - 95%      Orders placed during this visit:  Orders Placed This Encounter   Procedures    COVID PRE-OP / PRE-PROCEDURE SCREENING ORDER (NO ISOLATION) - Swab, Nasopharynx    Blood Culture - Blood,    Blood Culture - Blood,    COVID-19 and FLU A/B PCR, 1 HR TAT - Swab, Nasopharynx    XR Chest 1 View    [place] Draw    Comprehensive Metabolic Panel    Single High Sensitivity Troponin T    Magnesium    Urinalysis With Microscopic If Indicated (No Culture) - Urine, Clean Catch    CBC Auto Differential    Lactic Acid, Plasma    Procalcitonin    Urinalysis, Microscopic Only - Urine, Clean Catch    NPO Diet NPO Type: Strict NPO    Undress & Gown    Continuous Pulse Oximetry    Vital Signs    Orthostatic Blood Pressure    Straight cath    Oxygen Therapy- Nasal Cannula; Titrate 1-6 LPM Per SpO2; 90 - 95%    POC Glucose Once    ECG 12 Lead ED Triage Standing Order; Weak / Dizzy / AMS    Insert Peripheral IV    Fall Precautions    CBC & Differential    Green Top (Gel)    Lavender Top    Gold Top - SST    Gray Top    Light Blue Top       All labs have been independently reviewed by me.  All radiology studies have been reviewed by me and the radiologist dictating the report.  All EKG's have been independently viewed and interpreted by me.      Discussion below represents my analysis of pertinent findings related to patient's condition, differential diagnosis, treatment plan and final disposition.    Differential diagnosis:  The differential diagnosis associated with the patient's presentation includes: Sepsis, urinary tract infection, dehydration, electrolyte abnormalities, viral illness, pneumonia    Additional sources  Discussed/  obtained information from independent historians:   [] Spouse  [] Parent  [] Family member  [] Friend  [x] EMS   [] Other:    External (non-ED) record review:   [] Inpatient record:   [] Office record:   [] Outpatient record:   [] Prior Outpatient labs:   [] Prior Outpatient radiology:   [] Primary Care record:   [] Outside ED record:   [] Other:     Patient's care impacted by:   [] Diabetes  [] Hypertension  [] CHF  [] Hyperlipidemia  [] Coronary Artery Disease   [] COPD   [] Cancer   [] Tobacco Abuse   [] Substance Abuse    [] Other:     Care significantly affected by Social Determinants of Health (housing and economic circumstances, unemployment)    [] Yes     [x] No   If yes, Patient's care significantly limited by Social Determinants of Health including:   [] Inadequate housing   [] Low income   [] Alcoholism and drug addiction in family   [] Problems related to primary support group   [] Unemployment   [] Problems related to employment   [] Other Social Determinants of Health:       MEDICATIONS ADMINISTERED IN ED:  Medications   sodium chloride 0.9 % flush 10 mL (has no administration in time range)   acetaminophen (TYLENOL) tablet 1,000 mg (1,000 mg Oral Given 2/18/24 2316)   cefTRIAXone (ROCEPHIN) 2,000 mg in sodium chloride 0.9 % 100 mL IVPB (0 mg Intravenous Stopped 2/19/24 0005)              This is a pleasant 69-year-old female who has had increasing weakness, fever chills worsening throughout the afternoon today.  She is usually able to ambulate on her own accord, unfortunately she has been very weak unable to get out of bed and perform her activities this afternoon.  Temperature 100.3, she is tachycardic upon arrival, we will initiate septic labs, cultures.  X-ray left lower lobe infiltrate, effusion.  She is positive for COVID-19, negative for influenza.  We will cover for pneumonia, given her weakness and inability to ambulate the patient would not do well at home, would benefit from admission for  treatment and therapies until she is able to perform her activities.  Family and patient were updated on the current findings, we will plan for admission in the hospital for further workup and treatment.  Case discussed with hospitalist, Dr. Lim.      PROCEDURES:  Procedures    CRITICAL CARE TIME    Total Critical Care time was 0 minutes, excluding separately reportable procedures.   There was a high probability of clinically significant/life threatening deterioration in the patient's condition which required my urgent intervention.      FINAL IMPRESSION      1. COVID-19 virus infection    2. Pneumonia of left lower lobe due to infectious organism    3. Generalized weakness    4. Unable to ambulate          DISPOSITION/PLAN     ED Disposition       ED Disposition   Decision to Admit    Condition   --    Comment   --                 Comment: Please note this report has been produced using speech recognition software.      Cr Castillo DO  Attending Emergency Physician         Cr Castillo DO  02/19/24 0046

## 2024-02-19 NOTE — PLAN OF CARE
Goal Outcome Evaluation:  Plan of Care Reviewed With: patient        Progress: no change  Outcome Evaluation: Pt on RA. NSR. NS @ 75ml/hr. Abx starting this morning. Pt slept between care.

## 2024-02-19 NOTE — ED NOTES
Lizbet Flores    Nursing Report ED to Floor:  Mental status: GCS 15  Ambulatory status: UP WITH 2  Oxygen Therapy:  ROOM AIR  Cardiac Rhythm: NSR-ST  Admitted from: ED/HOME  Safety Concerns:  FALL RISK  Social Issues: NA  ED Room #:  7    ED Nurse Phone Extension - [phone] or may call [phone].      HPI:   Chief Complaint   Patient presents with    Weakness - Generalized    Headache       Past Medical History:  No past medical history on file.     Past Surgical History:  No past surgical history on file.     Admitting Doctor:   Cruz Lim III, DO    Consulting Provider(s):  Consults       No orders found for last 30 day(s).             Admitting Diagnosis:   The primary encounter diagnosis was COVID-19 virus infection. Diagnoses of Pneumonia of left lower lobe due to infectious organism, Generalized weakness, and Unable to ambulate were also pertinent to this visit.    Most Recent Vitals:   Vitals:    02/19/24 0053 02/19/24 0100 02/19/24 0101 02/19/24 0130   BP:  146/69  147/68   Pulse: 98  94 95   Resp:       Temp:       TempSrc:       SpO2: 93%  94% 93%   Weight:       Height:           Active LDAs/IV Access:   Lines, Drains & Airways       Active LDAs       Name Placement date Placement time Site Days    Peripheral IV 02/18/24 2310 Anterior;Left Forearm 02/18/24 2310  Forearm  less than 1                    Labs (abnormal labs have a star):   Labs Reviewed   COVID-19 AND FLU A/B, NP SWAB IN TRANSPORT MEDIA 1 HR TAT - Abnormal; Notable for the following components:       Result Value    COVID19 Detected (*)     All other components within normal limits    Narrative:     Fact sheet for providers: https://www.fda.gov/media/305759/download    Fact sheet for patients: https://www.fda.gov/media/657424/download    Test performed by PCR.  Influenza A and Influenza B negative results should be considered presumptive in samples that have a positive SARS-CoV-2 result.    Competitive inhibition studies showed that  SARS-CoV-2 virus, when present at concentrations above 3.6E+04 copies/mL, can inhibit the detection and amplification of influenza A and influenza B virus RNA if present at or below 1.8E+02 copies/mL or 4.9E+02 copies/mL, respectively, and may lead to false negative influenza virus results. If co-infection with influenza A or influenza B virus is suspected in samples with a positive SARS-CoV-2 result, the sample should be re-tested with another FDA cleared, approved, or authorized influenza test, if influenza virus detection would change clinical management.   COMPREHENSIVE METABOLIC PANEL - Abnormal; Notable for the following components:    Glucose 137 (*)     All other components within normal limits    Narrative:     GFR Normal >60  Chronic Kidney Disease <60  Kidney Failure <15     URINALYSIS W/ MICROSCOPIC IF INDICATED (NO CULTURE) - Abnormal; Notable for the following components:    Appearance, UA Cloudy (*)     Blood, UA Trace (*)     Leuk Esterase, UA Trace (*)     All other components within normal limits   CBC WITH AUTO DIFFERENTIAL - Abnormal; Notable for the following components:    Lymphocyte % 12.3 (*)     Monocyte % 16.3 (*)     Eosinophil % 0.2 (*)     Monocytes, Absolute 0.95 (*)     All other components within normal limits   URINALYSIS, MICROSCOPIC ONLY - Abnormal; Notable for the following components:    RBC, UA 21-50 (*)     WBC, UA 3-5 (*)     Bacteria, UA 1+ (*)     Squamous Epithelial Cells, UA 3-6 (*)     All other components within normal limits   SINGLE HSTROPONIN T - Normal    Narrative:     High Sensitive Troponin T Reference Range:  <14.0 ng/L- Negative Female for AMI  <22.0 ng/L- Negative Male for AMI  >=14 - Abnormal Female indicating possible myocardial injury.  >=22 - Abnormal Male indicating possible myocardial injury.   Clinicians would have to utilize clinical acumen, EKG, Troponin, and serial changes to determine if it is an Acute Myocardial Infarction or myocardial injury due  "to an underlying chronic condition.        MAGNESIUM - Normal   LACTIC ACID, PLASMA - Normal   PROCALCITONIN - Normal    Narrative:     As a Marker for Sepsis (Non-Neonates):    1. <0.5 ng/mL represents a low risk of severe sepsis and/or septic shock.  2. >2 ng/mL represents a high risk of severe sepsis and/or septic shock.    As a Marker for Lower Respiratory Tract Infections that require antibiotic therapy:    PCT on Admission    Antibiotic Therapy       6-12 Hrs later    >0.5                Strongly Recommended  >0.25 - <0.5        Recommended   0.1 - 0.25          Discouraged              Remeasure/reassess PCT  <0.1                Strongly Discouraged     Remeasure/reassess PCT    As 28 day mortality risk marker: \"Change in Procalcitonin Result\" (>80% or <=80%) if Day 0 (or Day 1) and Day 4 values are available. Refer to http://www.LogoGardenpct-calculator.com    Change in PCT <=80%  A decrease of PCT levels below or equal to 80% defines a positive change in PCT test result representing a higher risk for 28-day all-cause mortality of patients diagnosed with severe sepsis for septic shock.    Change in PCT >80%  A decrease of PCT levels of more than 80% defines a negative change in PCT result representing a lower risk for 28-day all-cause mortality of patients diagnosed with severe sepsis or septic shock.      COVID PRE-OP / PRE-PROCEDURE SCREENING ORDER (NO ISOLATION)    Narrative:     The following orders were created for panel order COVID PRE-OP / PRE-PROCEDURE SCREENING ORDER (NO ISOLATION) - Swab, Nasopharynx.  Procedure                               Abnormality         Status                     ---------                               -----------         ------                     COVID-19 and FLU A/B PCR...[[id number]]  Abnormal            Final result                 Please view results for these tests on the individual orders.   BLOOD CULTURE   BLOOD CULTURE   RAINBOW DRAW    Narrative:     The following " orders were created for panel order [place] Draw.  Procedure                               Abnormality         Status                     ---------                               -----------         ------                     Green Top (Gel)[[id number]]                                  Final result               Lavender Top[[id number]]                                     Final result               Gold Top - SST[[id number]]                                   Final result               [place] Top[[id number]]                                         In process                 Light Blue Top[[id number]]                                   Final result                 Please view results for these tests on the individual orders.   POCT GLUCOSE FINGERSTICK   CBC AND DIFFERENTIAL    Narrative:     The following orders were created for panel order CBC & Differential.  Procedure                               Abnormality         Status                     ---------                               -----------         ------                     CBC Auto Differential[[id number]]        Abnormal            Final result                 Please view results for these tests on the individual orders.   GREEN TOP   LAVENDER TOP   GOLD TOP - SST   LIGHT BLUE TOP   GRAY TOP       Meds Given in ED:   Medications   sodium chloride 0.9 % flush 10 mL (has no administration in time range)   acetaminophen (TYLENOL) tablet 1,000 mg (1,000 mg Oral Given 2/18/24 2316)   cefTRIAXone (ROCEPHIN) 2,000 mg in sodium chloride 0.9 % 100 mL IVPB (0 mg Intravenous Stopped 2/19/24 0005)

## 2024-02-19 NOTE — H&P
[facility] Medicine Services  HISTORY AND PHYSICAL    Patient Name: Lizbet Flores  : 1955  MRN: [id number]  Primary Care Physician: Sonny Guzman MD  Date of admission: 2024      Subjective   Subjective     Chief Complaint:  Fatigue, weakness, dyspnea    HPI:  Lizbet Flores is a 69 y.o. female who states that she woke up this morning () and immediately noticed increased fatigue and generalized weakness, and increasing shortness of breath throughout the day.  She confirms occasional cough but no production.  She denies any fever or chills, denies anosmia/ageusia.  She states that today she felt too weak to even get out of bed.  She does not use any oxygen at home, and ED provider confirms that the patient initially arrived on 2 L by nasal cannula, this has been removed and she is maintaining good O2 saturations on room air; during my visit she is in the mid/high 90s on room air.  Workup in the ED included respiratory swab which yielded a positive result for COVID-19.  She denies any known contact with anyone recently diagnosed with COVID.  She denies chest pain, abdominal pain, bowel habit change, slurred speech/facial droop, visual changes, dizziness/lightheadedness, or syncope.  Her medical history is significant for hypertension and type 2 diabetes.      Personal History     Medical history is as noted above in the HPI.        No past surgical history on file.    Family History: Mother had cardiac disease; father had lung cancer.    Social History:  She states she has never smoked, she does not use any street drugs or drink alcohol.  Social History     Social History Narrative   • Not on file       Medications:  Available home medication information reviewed.       No Known Allergies    Objective   Objective     Vital Signs:   Temp:  [100.3 °F (37.9 °C)] 100.3 °F (37.9 °C)  Heart Rate:  [] 92  Resp:  [18] 18  BP: (129-183)/(63-93) 129/63    "    Physical Exam   Constitutional: Awake, alert.  Slightly somnolent but awakens fully to answer all questions and follow all commands, NAD, otherwise pleasant.  Eyes: PERRLA, sclerae anicteric, no conjunctival injection  HENT: NCAT, mucous membranes dry.  Neck: Supple, no thyromegaly, no lymphadenopathy, trachea midline  Respiratory: Decreased/\"muffled\" sounds to auscultation bilaterally, no R/R/W, nonlabored respirations   Cardiovascular: RRR, no murmurs, rubs, or gallops, palpable pedal pulses bilaterally  Gastrointestinal: Positive bowel sounds, soft, nontender, nondistended  Musculoskeletal: No bilateral ankle edema, no clubbing or cyanosis to extremities  Psychiatric: Appropriate affect, cooperative  Neurologic: Oriented x 3, strength symmetric in all extremities, Cranial Nerves grossly intact to confrontation, speech clear  Skin: No rashes, normal turgor.    Result Review:  I have personally reviewed the results from the time of this admission to 2/19/2024 02:31 EST and agree with these findings:  [x]  Laboratory list / accordion  []  Microbiology  [x]  Radiology  [x]  EKG/Telemetry   []  Cardiology/Vascular   []  Pathology  []  Old records  []  Other:  Most notable findings include: I reviewed chest x-ray which is a single AP view and by my read shows slight increased opacity in the left lower lobe, no edema.  I reviewed chest x-ray which by my read shows sinus tachycardia with ventricular rate just over 100 bpm, normal axis, nonspecific ST/T wave changes but no acute appearing ST elevation.      LAB RESULTS:      Lab 02/18/24  2222   WBC 5.83   HEMOGLOBIN 13.2   HEMATOCRIT 41.9   PLATELETS 258   NEUTROS ABS 4.12   IMMATURE GRANS (ABS) 0.01   LYMPHS ABS 0.72   MONOS ABS 0.95*   EOS ABS 0.01   MCV 91.3   PROCALCITONIN 0.05   LACTATE 1.4         Lab 02/18/24  2222   SODIUM 137   POTASSIUM 3.9   CHLORIDE 104   CO2 23.0   ANION GAP 10.0   BUN 12   CREATININE 0.76   EGFR 84.4   GLUCOSE 137*   CALCIUM 8.9 "   MAGNESIUM 1.8         Lab 02/18/24 2222   TOTAL PROTEIN 7.2   ALBUMIN 3.6   GLOBULIN 3.6   ALT (SGPT) 10   AST (SGOT) 18   BILIRUBIN 0.2   ALK PHOS 116         Lab 02/18/24 2222   HSTROP T 9                 UA          2/18/2024    23:58   Urinalysis   Squamous Epithelial Cells, UA 3-6    Specific Gravity, UA 1.019    Ketones, UA Negative    Blood, UA Trace    Leukocytes, UA Trace    Nitrite, UA Negative    RBC, UA 21-50    WBC, UA 3-5    Bacteria, UA 1+        Microbiology Results (last 10 days)       Procedure Component Value - Date/Time    COVID PRE-OP / PRE-PROCEDURE SCREENING ORDER (NO ISOLATION) - Swab, Nasopharynx [[id number]]  (Abnormal) Collected: 02/18/24 2252    Lab Status: Final result Specimen: Swab from Nasopharynx Updated: 02/18/24 2334    Narrative:      The following orders were created for panel order COVID PRE-OP / PRE-PROCEDURE SCREENING ORDER (NO ISOLATION) - Swab, Nasopharynx.  Procedure                               Abnormality         Status                     ---------                               -----------         ------                     COVID-19 and FLU A/B PCR...[[id number]]  Abnormal            Final result                 Please view results for these tests on the individual orders.    COVID-19 and FLU A/B PCR, 1 HR TAT - Swab, Nasopharynx [[id number]]  (Abnormal) Collected: 02/18/24 2252    Lab Status: Final result Specimen: Swab from Nasopharynx Updated: 02/18/24 2334     COVID19 Detected     Influenza A PCR Not Detected     Influenza B PCR Not Detected    Narrative:      Fact sheet for providers: https://www.fda.gov/media/906610/download    Fact sheet for patients: https://www.fda.gov/media/815785/download    Test performed by PCR.  Influenza A and Influenza B negative results should be considered presumptive in samples that have a positive SARS-CoV-2 result.    Competitive inhibition studies showed that SARS-CoV-2 virus, when present at concentrations above 3.6E+04  copies/mL, can inhibit the detection and amplification of influenza A and influenza B virus RNA if present at or below 1.8E+02 copies/mL or 4.9E+02 copies/mL, respectively, and may lead to false negative influenza virus results. If co-infection with influenza A or influenza B virus is suspected in samples with a positive SARS-CoV-2 result, the sample should be re-tested with another FDA cleared, approved, or authorized influenza test, if influenza virus detection would change clinical management.            XR Chest 1 View    Result Date: 2/18/2024  XR CHEST 1 VW Date of Exam: 2/18/2024 10:28 PM EST Indication: Weak/Dizzy/AMS triage protocol Comparison: None available. Findings: Mild patchy airspace disease seen within the left lower lobe. May be a small left-sided pleural effusion. No pneumothorax. The pulmonary vasculature appears within normal limits. The cardiac and mediastinal silhouette appear unremarkable. No acute osseous abnormality identified.     Impression: Impression: Mild left lower lobe pneumonia with possible small left-sided pleural effusion. Electronically Signed: Celsa Hall MD  2/18/2024 10:53 PM EST  Workstation ID: [id number]         Assessment & Plan   Assessment & Plan       COVID-19      69 F with COVID-19    COVID-19  Left lower lobe infiltrate on chest x-ray  - She is not requiring any supplemental oxygen, saturations good on room air; therefore we will defer starting dexamethasone/remdesivir or Paxlovid at this time.  - Can add O2 by nasal cannula if required.  - Albuterol inhaler as needed.  - Monitor inflammatory biomarkers.  - IV antibiotic coverage for any possible bacterial component of her pneumonia was started in the ED, and will be continued.  - Will add D-dimer to labs; if elevated will order CTA chest.    Hypertension  - Continue lisinopril from home regimen.    Type 2 diabetes  - SSI coverage with AC/at bedtime fingersticks, also hypoglycemia coverage.  - Hold metformin from  home regimen for now.        Total time spent: 78 minutes  Time spent includes time reviewing chart, face-to-face time, counseling patient/family/caregiver, ordering medications/tests/procedures, communicating with other health care professionals, documenting clinical information in the electronic health record, and coordination of care.       DVT prophylaxis:  Medical DVT prophylaxis orders are present.          CODE STATUS:    Code Status and Medical Interventions:   Ordered at: 02/19/24 0230     Level Of Support Discussed With:    Patient     Code Status (Patient has no pulse and is not breathing):    CPR (Attempt to Resuscitate)     Medical Interventions (Patient has pulse or is breathing):    Full Support       Expected Discharge   Expected discharge date/ time has not been documented.     Cruz Lim III, DO  02/19/24

## 2024-02-19 NOTE — PLAN OF CARE
Goal Outcome Evaluation:  Plan of Care Reviewed With: patient           Outcome Evaluation: Patient ambulates with shuffled gait, reaching for furniture, patient declined use of RW despite education. Patient currently presents below baseline for mobility and would continue to benefit from skilled PT to address strength, balance and activity tolerance deficits.      Anticipated Discharge Disposition (PT): inpatient rehabilitation facility

## 2024-02-19 NOTE — PROGRESS NOTES
[facility] Medicine Services  ADMISSION FOLLOW-UP NOTE          Patient admitted after midnight, H&P by my partner performed earlier on today's date reviewed.  Interim findings, labs, and charting also reviewed.        The [facility] Hospital Problem List has been managed and updated to include any new diagnoses:  Active Hospital Problems    Diagnosis  POA    **COVID-19 [U07.1]  Yes    HTN (hypertension) [I10]  Yes    DMII (diabetes mellitus, type 2) [E11.9]  Yes      Resolved Hospital Problems   No resolved problems to display.         ADDITIONAL PLAN:  - detailed assessment and plan from admission reviewed    Lizbet Flores is a 69 y.o. female with PMHx significant for DMII,HTN who prsented with weakness, SOA and cough.    COVID-19  Left lower lobe infiltrate on chest x-ray  - She is not requiring any supplemental oxygen, saturations good on room air; therefore we will defer starting dexamethasone/remdesivir or Paxlovid   - Albuterol inhaler as needed.  - Monitor inflammatory biomarkers.  - IV antibiotic coverage for any possible bacterial component of her pneumonia was started in the ED, continue for now  - D-dimer WNL     Hypertension  - Continue lisinopril from home regimen.     Type 2 diabetes  - SSI coverage with AC/at bedtime fingersticks, also hypoglycemia coverage.  - Hold metformin from home regimen for now.       Expected Discharge TBD  Expected discharge date/ time has not been documented.     Debora Rebollar,   02/19/24

## 2024-02-19 NOTE — THERAPY EVALUATION
Patient Name: Lizbet Flores  : 1955    MRN: [id number]                              Today's Date: 2024       Admit Date: 2024    Visit Dx:     ICD-10-CM ICD-9-CM   1. COVID-19 virus infection  U07.1 079.89   2. Pneumonia of left lower lobe due to infectious organism  J18.9 486   3. Generalized weakness  R53.1 780.79   4. Unable to ambulate  R26.2 719.7     Patient Active Problem List   Diagnosis    COVID-19     Past Medical History:   Diagnosis Date    Hypertension      History reviewed. No pertinent surgical history.   General Information       Row Name 24 0859          OT Time and Intention    Document Type evaluation  -AF     Mode of Treatment occupational therapy  -AF       Row Name 2459          General Information    Patient Profile Reviewed yes  -AF     Prior Level of Function independent:;all household mobility;community mobility;bed mobility;ADL's;home management;shopping  -AF     Existing Precautions/Restrictions fall;other (see comments)  hypertensive  -AF     Barriers to Rehab medically complex  -AF       Row Name 24 0859          Occupational Profile    Environmental Supports and Barriers (Occupational Profile) Pt lives alone. Has ramp to enter house and only utilizes main floor. Pt was indp. in all ADLs/IADLs prior to hospitilization. Pt has walker, cane, shower chair at home that was her husbands. Pt has walk in shower with grab bars. Pt did not use any AD prior to hospiltization.  -AF       Row Name 24 0859          Living Environment    People in Home alone  -AF       Row Name 24 0859          Home Main Entrance    Number of Stairs, Main Entrance other (see comments)  ramp  -AF       Row Name 2459          Stairs Within Home, Primary    Number of Stairs, Within Home, Primary none  -AF     Stairs Comment, Within Home, Primary does not access 2nd floor  -AF       Row Name 2459          Cognition    Orientation Status (Cognition)  oriented x 4  -AF       Row Name 02/19/24 0859          Safety Issues, Functional Mobility    Impairments Affecting Function (Mobility) balance;endurance/activity tolerance;strength  -AF               User Key  (r) = Recorded By, (t) = Taken By, (c) = Cosigned By      Initials Name Provider Type    AF Isis Spencer OT Occupational Therapist                     Mobility/ADL's       Row Name 02/19/24 0902          Bed Mobility    Bed Mobility supine-sit;sit-supine  -AF     Supine-Sit [place] (Bed Mobility) contact guard  -AF     Sit-Supine [place] (Bed Mobility) minimum assist (75% patient effort)  -AF     Bed Mobility, Safety Issues decreased use of legs for bridging/pushing  -AF     Assistive Device (Bed Mobility) bed rails;head of bed elevated  -AF     Comment, (Bed Mobility) Pt required assist for getting legs onto bed d/t excessive weakness.  -AF       Row Name 02/19/24 0902          Transfers    Comment, (Transfers) Not completed d/t blood presure.  -AF       Row Name 02/19/24 0902          Functional Mobility    Functional Mobility- Comment Not completed d/t blood pressure.  -AF       Row Name 02/19/24 0902          Activities of Daily Living    BADL Assessment/Intervention grooming  -AF       Row Name 02/19/24 0902          Grooming Assessment/Training    [place] Level (Grooming) wash face, hands;set up  -AF     Position (Grooming) sitting up in bed  -AF               User Key  (r) = Recorded By, (t) = Taken By, (c) = Cosigned By      Initials Name Provider Type    AF Isis Spencer OT Occupational Therapist                   Obj/Interventions       Row Name 02/19/24 0902          Sensory Assessment (Somatosensory)    Sensory Assessment (Somatosensory) UE sensation intact  -AF       Row Name 02/19/24 0902          Vision Assessment/Intervention    Visual Impairment/Limitations WFL  -AF       Row Name 02/19/24 0902          Range of Motion Comprehensive    General Range of Motion bilateral upper  extremity ROM WFL  -AF       Row Name 02/19/24 0902          Strength Comprehensive (MMT)    General Manual Muscle Testing (MMT) Assessment upper extremity strength deficits identified  -AF     Comment, General Manual Muscle Testing (MMT) Assessment BUE grossly 3+/5  -AF       Row Name 02/19/24 0902          Motor Skills    Motor Skills functional endurance  -AF     Functional Endurance Poor+ while seated EOB.  -AF       Row Name 02/19/24 0902          Balance    Balance Assessment sitting static balance;sitting dynamic balance  -AF     Static Sitting Balance contact guard  -AF     Dynamic Sitting Balance contact guard  -AF     Position, Sitting Balance unsupported;sitting edge of bed  -AF     Balance Interventions sitting;static;dynamic;occupation based/functional task  -AF     Comment, Balance grooming  -AF               User Key  (r) = Recorded By, (t) = Taken By, (c) = Cosigned By      Initials Name Provider Type    AF Isis Spencer OT Occupational Therapist                   Goals/Plan       [facility] Name 02/19/24 0905          Bed Mobility Goal 1 (OT)    Activity/Assistive Device (Bed Mobility Goal 1, OT) sit to supine/supine to sit  -AF     [place] Level/Cues Needed (Bed Mobility Goal 1, OT) independent  -AF     Time Frame (Bed Mobility Goal 1, OT) long term goal (LTG);by discharge  -AF       [facility] Name 02/19/24 0905          Transfer Goal 1 (OT)    Activity/Assistive Device (Transfer Goal 1, OT) sit-to-stand/stand-to-sit;toilet;commode, bedside without drop arms  -AF     [place] Level/Cues Needed (Transfer Goal 1, OT) minimum assist (75% or more patient effort)  -AF     Time Frame (Transfer Goal 1, OT) long term goal (LTG);by discharge  -AF       [facility] Name 02/19/24 0905          Dressing Goal 1 (OT)    Activity/Device (Dressing Goal 1, OT) upper body dressing;lower body dressing  -AF     [place]/Cues Needed (Dressing Goal 1, OT) minimum assist (75% or more patient effort)  -AF     Time Frame  (Dressing Goal 1, OT) long term goal (LTG);by discharge  -AF       Row Name 02/19/24 0905          Grooming Goal 1 (OT)    Activity/Device (Grooming Goal 1, OT) grooming skills, all  -AF     [place] (Grooming Goal 1, OT) set-up required  -AF     Time Frame (Grooming Goal 1, OT) long term goal (LTG);by discharge  -AF       Row Name 02/19/24 0905          Therapy Assessment/Plan (OT)    Planned Therapy Interventions (OT) activity tolerance training;adaptive equipment training;BADL retraining;functional balance retraining;occupation/activity based interventions;patient/caregiver education/training;ROM/therapeutic exercise;strengthening exercise;transfer/mobility retraining  -AF               User Key  (r) = Recorded By, (t) = Taken By, (c) = Cosigned By      Initials Name Provider Type    AF Isis Spencer OT Occupational Therapist                   Clinical Impression       Row Name 02/19/24 0903          Pain Assessment    Pretreatment Pain Rating 0/10 - no pain  -AF     Posttreatment Pain Rating 0/10 - no pain  -AF       Row Name 02/19/24 0903          Plan of Care Review    Plan of Care Reviewed With patient;family  -AF     Outcome Evaluation Pt presents with generalized weakness and decreased activity tolerance impacting ADL/IADL and fxnl mobility indp. Pt blood pressure 196/98 when sitting EOB, nsg notified and pt return to bed. Blood pressure 188/99 once return to bed, nsg notified. Rec. IPOT to address deficits and support return to baseline. Rec. SNF upon d/c, will monitor closely.  -AF       Row Name 02/19/24 0903          Therapy Assessment/Plan (OT)    Patient/Family Therapy Goal Statement (OT) Return to PLOF  -AF     Rehab Potential (OT) good, to achieve stated therapy goals  -AF     Criteria for Skilled Therapeutic Interventions Met (OT) yes;meets criteria;skilled treatment is necessary  -AF     Therapy Frequency (OT) daily  -AF       Row Name 02/19/24 0903          Therapy Plan Review/Discharge Plan  (OT)    Anticipated Discharge Disposition (OT) skilled nursing facility  -AF       Row Name 02/19/24 0903          Vital Signs    Pre Systolic BP Rehab 181  -AF     Pre Treatment Diastolic BP 91  -AF     Intra Systolic BP Rehab 196  -AF     Intra Treatment Diastolic BP 98  -AF     Post Systolic BP Rehab 188  -AF     Post Treatment Diastolic BP 99  -AF     Pre SpO2 (%) 98  -AF     O2 Delivery Pre Treatment room air  -AF     Intra SpO2 (%) 95  -AF     O2 Delivery Intra Treatment room air  -AF     Post SpO2 (%) 96  -AF     O2 Delivery Post Treatment room air  -AF     Pre Patient Position Supine  -AF     Intra Patient Position Sitting  -AF     Post Patient Position Supine  -AF       Row Name 02/19/24 0903          Positioning and Restraints    Pre-Treatment Position in bed  -AF     Post Treatment Position bed  -AF     In Bed notified nsg;call light within reach;legs elevated;supine;encouraged to call for assist;exit alarm on  -AF               User Key  (r) = Recorded By, (t) = Taken By, (c) = Cosigned By      Initials Name Provider Type    AF Spencer, RHONDA Marinelli Occupational Therapist                   Outcome Measures       Row Name 02/19/24 0906          How much help from another is currently needed...    Putting on and taking off regular lower body clothing? 2  -AF     Bathing (including washing, rinsing, and drying) 2  -AF     Toileting (which includes using toilet bed pan or urinal) 2  -AF     Putting on and taking off regular upper body clothing 3  -AF     Taking care of personal grooming (such as brushing teeth) 3  -AF     Eating meals 3  -AF     AM-PAC 6 Clicks Score (OT) 15  -AF       Row Name 02/19/24 0246          How much help from another person do you currently need...    Turning from your back to your side while in flat bed without using bedrails? 3  -OM     Moving from lying on back to sitting on the side of a flat bed without bedrails? 3  -OM     Moving to and from a bed to a chair (including a  wheelchair)? 3  -OM     Standing up from a chair using your arms (e.g., wheelchair, bedside chair)? 2  -OM     Climbing 3-5 steps with a railing? 2  -OM     To walk in hospital room? 2  -OM     AM-PAC 6 Clicks Score (PT) 15  -OM     Highest Level of Mobility Goal 4 --> Transfer to chair/commode  -OM       Row Name 02/19/24 0906          Functional Assessment    Outcome Measure Options AM-PAC 6 Clicks Daily Activity (OT)  -AF               User Key  (r) = Recorded By, (t) = Taken By, (c) = Cosigned By      Initials Name Provider Type    OM Gayle Hernandez RN Registered Nurse    Isis Lowry OT Occupational Therapist                    Occupational Therapy Education       Title: PT OT SLP Therapies (In Progress)       Topic: Occupational Therapy (In Progress)       Point: ADL training (Done)       Description:   Instruct learner(s) on proper safety adaptation and remediation techniques during self care or transfers.   Instruct in proper use of assistive devices.                  Learning Progress Summary             Patient Acceptance, E,TB, VU by AF at 2/19/2024 0907   Family Acceptance, E,TB, VU by AF at 2/19/2024 0907                         Point: Home exercise program (Not Started)       Description:   Instruct learner(s) on appropriate technique for monitoring, assisting and/or progressing therapeutic exercises/activities.                  Learner Progress:  Not documented in this visit.              Point: Precautions (Done)       Description:   Instruct learner(s) on prescribed precautions during self-care and functional transfers.                  Learning Progress Summary             Patient Acceptance, E,TB, VU by AF at 2/19/2024 0907   Family Acceptance, E,TB, VU by AF at 2/19/2024 0907                         Point: Body mechanics (Done)       Description:   Instruct learner(s) on proper positioning and spine alignment during self-care, functional mobility activities and/or exercises.                   Learning Progress Summary             Patient Acceptance, E,TB, VU by AF at 2/19/2024 0907   Family Acceptance, E,TB, VU by AF at 2/19/2024 0907                                         User Key       Initials Effective Dates Name Provider Type Discipline     08/15/23 -  Isis Spencer OT Occupational Therapist OT                  OT Recommendation and Plan  Planned Therapy Interventions (OT): activity tolerance training, adaptive equipment training, BADL retraining, functional balance retraining, occupation/activity based interventions, patient/caregiver education/training, ROM/therapeutic exercise, strengthening exercise, transfer/mobility retraining  Therapy Frequency (OT): daily  Plan of Care Review  Plan of Care Reviewed With: patient, family  Outcome Evaluation: Pt presents with generalized weakness and decreased activity tolerance impacting ADL/IADL and fxnl mobility indp. Pt blood pressure 196/98 when sitting EOB, nsg notified and pt return to bed. Blood pressure 188/99 once return to bed, nsg notified. Rec. IPOT to address deficits and support return to baseline. Rec. SNF upon d/c, will monitor closely.     Time Calculation:   Evaluation Complexity (OT)  Review Occupational Profile/Medical/Therapy History Complexity: expanded/moderate complexity  Assessment, Occupational Performance/Identification of Deficit Complexity: 3-5 performance deficits  Clinical Decision Making Complexity (OT): detailed assessment/moderate complexity  Overall Complexity of Evaluation (OT): moderate complexity     Time Calculation- OT       Row Name 02/19/24 0907             Time Calculation- OT    OT Start Time 0810  -AF      OT Received On 02/19/24  -AF      OT Goal Re-Cert Due Date 02/29/24  -AF         Untimed Charges    OT Eval/Re-eval Minutes 60  -AF         Total Minutes    Untimed Charges Total Minutes 60  -AF       Total Minutes 60  -AF                User Key  (r) = Recorded By, (t) = Taken By, (c) = Cosigned By       Initials Name Provider Type    AF Isis Spencer OT Occupational Therapist                  Therapy Charges for Today       Code Description Service Date Service Provider Modifiers Qty    [id number] HC OT EVAL MOD COMPLEXITY 4 2/19/2024 Isis Spencer OT GO 1                 Isis Spencer OT  2/19/2024

## 2024-02-20 LAB
GLUCOSE BLDC GLUCOMTR-MCNC: 195 MG/DL (ref 70–130)
GLUCOSE BLDC GLUCOMTR-MCNC: 78 MG/DL (ref 70–130)
GLUCOSE BLDC GLUCOMTR-MCNC: 92 MG/DL (ref 70–130)

## 2024-02-20 PROCEDURE — 25010000002 HEPARIN (PORCINE) PER 1000 UNITS: Performed by: INTERNAL MEDICINE

## 2024-02-20 PROCEDURE — 25010000002 AZITHROMYCIN PER 500 MG: Performed by: INTERNAL MEDICINE

## 2024-02-20 PROCEDURE — 63710000001 INSULIN LISPRO (HUMAN) PER 5 UNITS: Performed by: INTERNAL MEDICINE

## 2024-02-20 PROCEDURE — 96375 TX/PRO/DX INJ NEW DRUG ADDON: CPT

## 2024-02-20 PROCEDURE — 82948 REAGENT STRIP/BLOOD GLUCOSE: CPT

## 2024-02-20 PROCEDURE — 96361 HYDRATE IV INFUSION ADD-ON: CPT

## 2024-02-20 PROCEDURE — G0378 HOSPITAL OBSERVATION PER HR: HCPCS

## 2024-02-20 PROCEDURE — 96366 THER/PROPH/DIAG IV INF ADDON: CPT

## 2024-02-20 PROCEDURE — 96367 TX/PROPH/DG ADDL SEQ IV INF: CPT

## 2024-02-20 PROCEDURE — 25810000003 SODIUM CHLORIDE 0.9 % SOLUTION: Performed by: INTERNAL MEDICINE

## 2024-02-20 PROCEDURE — 25010000002 HYDRALAZINE PER 20 MG: Performed by: NURSE PRACTITIONER

## 2024-02-20 PROCEDURE — 96372 THER/PROPH/DIAG INJ SC/IM: CPT

## 2024-02-20 PROCEDURE — 25010000002 CEFTRIAXONE PER 250 MG: Performed by: INTERNAL MEDICINE

## 2024-02-20 PROCEDURE — 99232 SBSQ HOSP IP/OBS MODERATE 35: CPT | Performed by: NURSE PRACTITIONER

## 2024-02-20 PROCEDURE — 25810000003 SODIUM CHLORIDE 0.9 % SOLUTION 250 ML FLEX CONT: Performed by: INTERNAL MEDICINE

## 2024-02-20 RX ORDER — LISINOPRIL 20 MG/1
20 TABLET ORAL DAILY
Status: DISCONTINUED | OUTPATIENT
Start: 2024-02-21 | End: 2024-02-21 | Stop reason: HOSPADM

## 2024-02-20 RX ORDER — LISINOPRIL 10 MG/1
10 TABLET ORAL DAILY
Status: DISCONTINUED | OUTPATIENT
Start: 2024-02-20 | End: 2024-02-20

## 2024-02-20 RX ORDER — HYDRALAZINE HYDROCHLORIDE 20 MG/ML
10 INJECTION INTRAMUSCULAR; INTRAVENOUS ONCE
Status: COMPLETED | OUTPATIENT
Start: 2024-02-20 | End: 2024-02-20

## 2024-02-20 RX ORDER — METOPROLOL TARTRATE 1 MG/ML
5 INJECTION, SOLUTION INTRAVENOUS EVERY 6 HOURS PRN
Status: DISCONTINUED | OUTPATIENT
Start: 2024-02-20 | End: 2024-02-21 | Stop reason: HOSPADM

## 2024-02-20 RX ADMIN — HEPARIN SODIUM 5000 UNITS: 5000 INJECTION INTRAVENOUS; SUBCUTANEOUS at 21:47

## 2024-02-20 RX ADMIN — Medication 10 ML: at 20:00

## 2024-02-20 RX ADMIN — CEFTRIAXONE 2000 MG: 2 INJECTION, POWDER, FOR SOLUTION INTRAMUSCULAR; INTRAVENOUS at 20:01

## 2024-02-20 RX ADMIN — HEPARIN SODIUM 5000 UNITS: 5000 INJECTION INTRAVENOUS; SUBCUTANEOUS at 15:32

## 2024-02-20 RX ADMIN — SODIUM CHLORIDE 75 ML/HR: 9 INJECTION, SOLUTION INTRAVENOUS at 05:08

## 2024-02-20 RX ADMIN — INSULIN LISPRO 2 UNITS: 100 INJECTION, SOLUTION INTRAVENOUS; SUBCUTANEOUS at 12:50

## 2024-02-20 RX ADMIN — METOPROLOL TARTRATE 5 MG: 5 INJECTION INTRAVENOUS at 17:37

## 2024-02-20 RX ADMIN — AZITHROMYCIN 500 MG: 500 INJECTION, POWDER, LYOPHILIZED, FOR SOLUTION INTRAVENOUS at 08:30

## 2024-02-20 RX ADMIN — LISINOPRIL 10 MG: 10 TABLET ORAL at 08:29

## 2024-02-20 RX ADMIN — HYDRALAZINE HYDROCHLORIDE 10 MG: 20 INJECTION INTRAMUSCULAR; INTRAVENOUS at 19:54

## 2024-02-20 RX ADMIN — HEPARIN SODIUM 5000 UNITS: 5000 INJECTION INTRAVENOUS; SUBCUTANEOUS at 05:09

## 2024-02-20 RX ADMIN — Medication 10 ML: at 08:30

## 2024-02-20 NOTE — PROGRESS NOTES
[facility] Medicine Services  PROGRESS NOTE    Patient Name: Lizbet Flores  : 1955  MRN: [id number]    Date of Admission: 2024  Primary Care Physician: Sonny Guzman MD    Subjective   Subjective     CC:  SOA/ cough     HPI:  Resting in bed, NAD. Nonproductive cough. No soa. No pain. Family in room states they would like patient to go to rehab.       Objective   Objective     Vital Signs:   Temp:  [98.5 °F (36.9 °C)-98.8 °F (37.1 °C)] 98.6 °F (37 °C)  Heart Rate:  [78-89] 78  Resp:  [18] 18  BP: (154-201)/() 154/99     Physical Exam:  Constitutional: No acute distress, awake, alert  HENT: NCAT, mucous membranes moist  Respiratory: Clear to auscultation bilaterally, respiratory effort normal   Cardiovascular: RRR, no murmurs, rubs, or gallops  Gastrointestinal: Positive bowel sounds, soft, nontender, nondistended  Musculoskeletal: No bilateral ankle edema  Psychiatric: Appropriate affect, cooperative  Neurologic: Oriented x 3, strength symmetric in all extremities, Cranial Nerves grossly intact to confrontation, speech clear  Skin: No rashes     Results Reviewed:  LAB RESULTS:      Lab 24  0424 242   WBC 4.66 5.83   HEMOGLOBIN 13.2 13.2   HEMATOCRIT 39.8 41.9   PLATELETS 237 258   NEUTROS ABS 2.35 4.12   IMMATURE GRANS (ABS) 0.01 0.01   LYMPHS ABS 1.32 0.72   MONOS ABS 0.96* 0.95*   EOS ABS 0.00 0.01   MCV 91.5 91.3   CRP 1.83*  --    PROCALCITONIN  --  0.05   LACTATE  --  1.4     --    D DIMER QUANT 0.55  --          Lab 24  0424 24  2222   SODIUM 136 137   POTASSIUM 3.8 3.9   CHLORIDE 104 104   CO2 22.0 23.0   ANION GAP 10.0 10.0   BUN 10 12   CREATININE 0.83 0.76   EGFR 75.9 84.4   GLUCOSE 112* 137*   CALCIUM 8.8 8.9   MAGNESIUM 2.0 1.8         Lab 24  0424 02/18/24  2222   TOTAL PROTEIN 6.9 7.2   ALBUMIN 3.6 3.6   GLOBULIN 3.3 3.6   ALT (SGPT) 8 10   AST (SGOT) 13 18   BILIRUBIN 0.2 0.2   ALK PHOS 111 116         Lab  02/18/24 2222   HSTROP T 9             Lab 02/19/24  0424   FERRITIN 67.13         Brief Urine Lab Results  (Last result in the past 365 days)        Color   Clarity   Blood   Leuk Est   Nitrite   Protein   CREAT   Urine HCG        02/18/24 2358 Yellow   Cloudy   Trace   Trace   Negative   Negative                   Microbiology Results Abnormal       Procedure Component Value - Date/Time    Blood Culture - Blood, Arm, Right [[id number]]  (Normal) Collected: 02/18/24 2249    Lab Status: Preliminary result Specimen: Blood from Arm, Right Updated: 02/19/24 2331     Blood Culture No growth at 24 hours    Blood Culture - Blood, Arm, Left [[id number]]  (Normal) Collected: 02/18/24 2249    Lab Status: Preliminary result Specimen: Blood from Arm, Left Updated: 02/19/24 2331     Blood Culture No growth at 24 hours            XR Chest 1 View    Result Date: 2/18/2024  XR CHEST 1 VW Date of Exam: 2/18/2024 10:28 PM EST Indication: Weak/Dizzy/AMS triage protocol Comparison: None available. Findings: Mild patchy airspace disease seen within the left lower lobe. May be a small left-sided pleural effusion. No pneumothorax. The pulmonary vasculature appears within normal limits. The cardiac and mediastinal silhouette appear unremarkable. No acute osseous abnormality identified.     Impression: Impression: Mild left lower lobe pneumonia with possible small left-sided pleural effusion. Electronically Signed: Celsa Hall MD  2/18/2024 10:53 PM EST  Workstation ID: [id number]         Current medications:  Scheduled Meds:[START ON 2/21/2024] azithromycin, 500 mg, Intravenous, Q24H  cefTRIAXone, 2,000 mg, Intravenous, Q24H  heparin (porcine), 5,000 Units, Subcutaneous, Q8H  insulin lispro, 2-7 Units, Subcutaneous, 4x Daily AC & at Bedtime  lisinopril, 10 mg, Oral, Daily  sodium chloride, 10 mL, Intravenous, Q12H      Continuous Infusions:   PRN Meds:.  acetaminophen    albuterol    Calcium Replacement - Follow Nurse / BPA Driven  Protocol    dextrose    dextrose    glucagon (human recombinant)    Magnesium Standard Dose Replacement - Follow Nurse / BPA Driven Protocol    melatonin    nitroglycerin    ondansetron    Phosphorus Replacement - Follow Nurse / BPA Driven Protocol    Potassium Replacement - Follow Nurse / BPA Driven Protocol    sodium chloride    sodium chloride    sodium chloride    Assessment & Plan   Assessment & Plan     Active Hospital Problems    Diagnosis  POA    **COVID-19 [U07.1]  Yes    HTN (hypertension) [I10]  Yes    DMII (diabetes mellitus, type 2) [E11.9]  Yes      Resolved Hospital Problems   No resolved problems to display.        Brief Hospital Course to date:  Lizbet Flores is a 69 y.o. female with PMHx significant for DMII,HTN who prsented with weakness, SOA and cough.     COVID-19  Left lower lobe infiltrate on chest x-ray  - She is not requiring any supplemental oxygen, saturations good on room air; therefore we will defer starting dexamethasone/remdesivir or Paxlovid   - Albuterol inhaler as needed.  - Monitor inflammatory biomarkers.  - Cont Azith/ Rocephin   - D-dimer WNL     Hypertension  - Increased home lisinopril to 10 mg; monitor overnight and possibly increase to 20 mg in AM if continues to remain elevated     UTI  --Rocephin   --urine culture pending      Type 2 diabetes  - SSI coverage with AC/at bedtime fingersticks, also hypoglycemia coverage.  - Hold metformin from home regimen for now.         Expected Discharge Location and Transportation: rehab   Expected Discharge 2 days   Expected Discharge Date: 2/22/2024; Expected Discharge Time:      DVT prophylaxis:  Medical DVT prophylaxis orders are present.         AM-PAC 6 Clicks Score (PT): 18 (02/19/24 7470)    CODE STATUS:   Code Status and Medical Interventions:   Ordered at: 02/19/24 0230     Level Of Support Discussed With:    Patient     Code Status (Patient has no pulse and is not breathing):    CPR (Attempt to Resuscitate)     Medical  Interventions (Patient has pulse or is breathing):    Full Support       Annita Payton, APRN  02/20/24

## 2024-02-20 NOTE — PLAN OF CARE
Goal Outcome Evaluation:                                            BP elevated this shift, lisinopril increased, 1600 set of vitals /97, 203/96, prn's and tomorrows lisinopril ordered

## 2024-02-20 NOTE — PLAN OF CARE
Goal Outcome Evaluation:  Plan of Care Reviewed With: patient        Progress: improving  Outcome Evaluation: VSS. RA. SR. NS @ 75ml/hr. No c/o pain. Pt slept between care.

## 2024-02-21 PROBLEM — D89.831 CYTOKINE RELEASE SYNDROME, GRADE 1: Status: ACTIVE | Noted: 2024-02-21

## 2024-02-21 LAB
ALBUMIN SERPL-MCNC: 3.6 G/DL (ref 3.5–5.2)
ALBUMIN/GLOB SERPL: 1 G/DL
ALP SERPL-CCNC: 109 U/L (ref 39–117)
ALT SERPL W P-5'-P-CCNC: 9 U/L (ref 1–33)
ANION GAP SERPL CALCULATED.3IONS-SCNC: 15 MMOL/L (ref 5–15)
AST SERPL-CCNC: 14 U/L (ref 1–32)
BACTERIA SPEC AEROBE CULT: NORMAL
BILIRUB SERPL-MCNC: 0.2 MG/DL (ref 0–1.2)
BUN SERPL-MCNC: 10 MG/DL (ref 8–23)
BUN/CREAT SERPL: 15.9 (ref 7–25)
CALCIUM SPEC-SCNC: 9.2 MG/DL (ref 8.6–10.5)
CHLORIDE SERPL-SCNC: 105 MMOL/L (ref 98–107)
CO2 SERPL-SCNC: 21 MMOL/L (ref 22–29)
CREAT SERPL-MCNC: 0.63 MG/DL (ref 0.57–1)
CRP SERPL-MCNC: 1.72 MG/DL (ref 0–0.5)
EGFRCR SERPLBLD CKD-EPI 2021: 95.6 ML/MIN/1.73
FERRITIN SERPL-MCNC: 92.03 NG/ML (ref 13–150)
GLOBULIN UR ELPH-MCNC: 3.6 GM/DL
GLUCOSE BLDC GLUCOMTR-MCNC: 109 MG/DL (ref 70–130)
GLUCOSE BLDC GLUCOMTR-MCNC: 112 MG/DL (ref 70–130)
GLUCOSE SERPL-MCNC: 103 MG/DL (ref 65–99)
LDH SERPL-CCNC: 140 U/L (ref 135–214)
POTASSIUM SERPL-SCNC: 3.9 MMOL/L (ref 3.5–5.2)
PROT SERPL-MCNC: 7.2 G/DL (ref 6–8.5)
SODIUM SERPL-SCNC: 141 MMOL/L (ref 136–145)

## 2024-02-21 PROCEDURE — G0378 HOSPITAL OBSERVATION PER HR: HCPCS

## 2024-02-21 PROCEDURE — 83615 LACTATE (LD) (LDH) ENZYME: CPT | Performed by: INTERNAL MEDICINE

## 2024-02-21 PROCEDURE — 99239 HOSP IP/OBS DSCHRG MGMT >30: CPT | Performed by: NURSE PRACTITIONER

## 2024-02-21 PROCEDURE — 25010000002 AZITHROMYCIN PER 500 MG: Performed by: INTERNAL MEDICINE

## 2024-02-21 PROCEDURE — 82728 ASSAY OF FERRITIN: CPT | Performed by: INTERNAL MEDICINE

## 2024-02-21 PROCEDURE — 25810000003 SODIUM CHLORIDE 0.9 % SOLUTION 250 ML FLEX CONT: Performed by: INTERNAL MEDICINE

## 2024-02-21 PROCEDURE — 96372 THER/PROPH/DIAG INJ SC/IM: CPT

## 2024-02-21 PROCEDURE — 86140 C-REACTIVE PROTEIN: CPT | Performed by: INTERNAL MEDICINE

## 2024-02-21 PROCEDURE — 25010000002 HEPARIN (PORCINE) PER 1000 UNITS: Performed by: INTERNAL MEDICINE

## 2024-02-21 PROCEDURE — 80053 COMPREHEN METABOLIC PANEL: CPT | Performed by: NURSE PRACTITIONER

## 2024-02-21 PROCEDURE — 82948 REAGENT STRIP/BLOOD GLUCOSE: CPT

## 2024-02-21 PROCEDURE — 97530 THERAPEUTIC ACTIVITIES: CPT

## 2024-02-21 RX ORDER — AZITHROMYCIN 250 MG/1
TABLET, FILM COATED ORAL
Qty: 2 TABLET | Refills: 0 | Status: SHIPPED | OUTPATIENT
Start: 2024-02-22

## 2024-02-21 RX ORDER — CEFUROXIME AXETIL 500 MG/1
500 TABLET ORAL 2 TIMES DAILY
Qty: 4 TABLET | Refills: 0 | Status: SHIPPED | OUTPATIENT
Start: 2024-02-21 | End: 2024-02-23

## 2024-02-21 RX ORDER — LISINOPRIL 10 MG/1
20 TABLET ORAL DAILY
Qty: 60 TABLET | Refills: 0 | Status: SHIPPED | OUTPATIENT
Start: 2024-02-22 | End: 2024-03-23

## 2024-02-21 RX ADMIN — AZITHROMYCIN 500 MG: 500 INJECTION, POWDER, LYOPHILIZED, FOR SOLUTION INTRAVENOUS at 08:27

## 2024-02-21 RX ADMIN — LISINOPRIL 20 MG: 20 TABLET ORAL at 08:27

## 2024-02-21 RX ADMIN — HEPARIN SODIUM 5000 UNITS: 5000 INJECTION INTRAVENOUS; SUBCUTANEOUS at 05:06

## 2024-02-21 RX ADMIN — Medication 10 ML: at 08:28

## 2024-02-21 NOTE — PLAN OF CARE
Goal Outcome Evaluation:  Plan of Care Reviewed With: patient        Progress: improving  Outcome Evaluation: Pt increasing distances for gait training, ambulating 5'+40' w/ CGA. Pt continues to present with weakness, balance deficits, and activity tolerance below baseline. Plan to continue progressing current PT POC as tolerated.      Anticipated Discharge Disposition (PT): inpatient rehabilitation facility

## 2024-02-21 NOTE — DISCHARGE PLACEMENT REQUEST
"Nicole Flores (69 y.o. Female)   Tonya Velarde RN  [phone]      Date of Birth   1955    Social Security Number       Address   [address] [place] [address]    Home Phone   [phone]    MRN   [id number]       Advent   [employer]    Marital Status                               Admission Date   24    Admission Type   Emergency    Admitting Provider   Debora Rebollar DO    Attending Provider   Debora Rebollar DO    Department, Room/Bed   [facility] 6B, N627/1       Discharge Date       Discharge Disposition   Home or Self Care    Discharge Destination                                 Attending Provider: Debora Rebollar DO    Allergies: No Known Allergies    Isolation: Contact Air   Infection: COVID (confirmed) (24)   Code Status: CPR    Ht: 152.4 cm (60\")   Wt: 81.6 kg (180 lb)    Admission Cmt: None   Principal Problem: COVID-19 [U07.1]                   Active Insurance as of 2024       Primary Coverage       Payor Plan Insurance Group Employer/Plan Group    HUMANA MEDICARE REPLACEMENT HUMANA MED ADV HMO [id number]       Payor Plan Address Payor Plan Phone Number Payor Plan Fax Number Effective Dates    [address] [address] [phone]  2024 - None Entered    [facility] [address]         Subscriber Name Subscriber Birth Date Member ID       NICOLE FLORES 1955 [id number]                     Emergency Contacts        (Rel.) Home Phone Work Phone Mobile Phone    BUCKY TREVIÑO (Daughter) -- -- [phone]    elsie bowser (Son) -- -- [phone]             [address]  [address] [facility] [address]  Phone:  [phone]  Fax:  [phone] Date: 2024      Ambulatory Referral to Physical Therapy Evaluate and treat; Full weight bearing     Patient:  Nicole Flores MRN:  [id number]   [address] [facility] [address] :  1955  SSN:    Phone: [phone] Sex:  " F      INSURANCE PAYOR PLAN GROUP # SUBSCRIBER ID   Primary:    HUMANA MEDICARE REPLACEMENT [id number]      Referring Provider Information:  LAMONT PLATT Phone: [phone] Fax: [phone]       Referral Information:   # Visits:  1 Referral Type: Physical Therapy [AE1]   Urgency:  Routine Referral Reason: Specialty Services Required   Start Date: 2024 End Date:  To be determined by Insurer   Diagnosis: COVID-19 virus infection (U07.1 [ICD-10-CM] 079.89 [ICD-9-CM])  Generalized weakness (R53.1 [ICD-10-CM] 780.79 [ICD-9-CM])      Refer to Dept:   Refer to Provider:   Refer to Provider Phone:   Refer to Facility:       Specialty needed: Evaluate and treat  Weight Bearing Status: Full weight bearing  Follow-up needed: Yes     This document serves as a request of services and does not constitute Insurance authorization or approval of services.  To determine eligibility, please contact the members Insurance carrier to verify and review coverage.     If you have medical questions regarding this request for services. Please contact [address] at [phone] during normal business hours.        Verbal Order Mode: Verbal with readback   Authorizing Provider: Lamont Platt DO  Authorizing Provider's NPI: [id number]     Order Entered By: Tonya Velarde RN 2024 11:18 AM     Electronically signed by:  Lamont Platt DO 2024 11:21 AM          History & Physical        Cruz Lim III, DO at 24 0231              [facility] Medicine Services  HISTORY AND PHYSICAL    Patient Name: Lizbet Flores  : 1955  MRN: [id number]  Primary Care Physician: Sonny Guzman MD  Date of admission: 2024      Subjective   Subjective     Chief Complaint:  Fatigue, weakness, dyspnea    HPI:  Lizbet Flores is a 69 y.o. female who states that she woke up this morning () and immediately noticed increased fatigue and  "generalized weakness, and increasing shortness of breath throughout the day.  She confirms occasional cough but no production.  She denies any fever or chills, denies anosmia/ageusia.  She states that today she felt too weak to even get out of bed.  She does not use any oxygen at home, and ED provider confirms that the patient initially arrived on 2 L by nasal cannula, this has been removed and she is maintaining good O2 saturations on room air; during my visit she is in the mid/high 90s on room air.  Workup in the ED included respiratory swab which yielded a positive result for COVID-19.  She denies any known contact with anyone recently diagnosed with COVID.  She denies chest pain, abdominal pain, bowel habit change, slurred speech/facial droop, visual changes, dizziness/lightheadedness, or syncope.  Her medical history is significant for hypertension and type 2 diabetes.      Personal History     Medical history is as noted above in the HPI.        No past surgical history on file.    Family History: Mother had cardiac disease; father had lung cancer.    Social History:  She states she has never smoked, she does not use any street drugs or drink alcohol.  Social History     Social History Narrative    Not on file       Medications:  Available home medication information reviewed.       No Known Allergies    Objective   Objective     Vital Signs:   Temp:  [100.3 °F (37.9 °C)] 100.3 °F (37.9 °C)  Heart Rate:  [] 92  Resp:  [18] 18  BP: (129-183)/(63-93) 129/63       Physical Exam   Constitutional: Awake, alert.  Slightly somnolent but awakens fully to answer all questions and follow all commands, NAD, otherwise pleasant.  Eyes: PERRLA, sclerae anicteric, no conjunctival injection  HENT: NCAT, mucous membranes dry.  Neck: Supple, no thyromegaly, no lymphadenopathy, trachea midline  Respiratory: Decreased/\"muffled\" sounds to auscultation bilaterally, no R/R/W, nonlabored respirations   Cardiovascular: RRR, no " murmurs, rubs, or gallops, palpable pedal pulses bilaterally  Gastrointestinal: Positive bowel sounds, soft, nontender, nondistended  Musculoskeletal: No bilateral ankle edema, no clubbing or cyanosis to extremities  Psychiatric: Appropriate affect, cooperative  Neurologic: Oriented x 3, strength symmetric in all extremities, Cranial Nerves grossly intact to confrontation, speech clear  Skin: No rashes, normal turgor.    Result Review:  I have personally reviewed the results from the time of this admission to 2/19/2024 02:31 EST and agree with these findings:  [x]  Laboratory list / accordion  []  Microbiology  [x]  Radiology  [x]  EKG/Telemetry   []  Cardiology/Vascular   []  Pathology  []  Old records  []  Other:  Most notable findings include: I reviewed chest x-ray which is a single AP view and by my read shows slight increased opacity in the left lower lobe, no edema.  I reviewed chest x-ray which by my read shows sinus tachycardia with ventricular rate just over 100 bpm, normal axis, nonspecific ST/T wave changes but no acute appearing ST elevation.      LAB RESULTS:      Lab 02/18/24  2222   WBC 5.83   HEMOGLOBIN 13.2   HEMATOCRIT 41.9   PLATELETS 258   NEUTROS ABS 4.12   IMMATURE GRANS (ABS) 0.01   LYMPHS ABS 0.72   MONOS ABS 0.95*   EOS ABS 0.01   MCV 91.3   PROCALCITONIN 0.05   LACTATE 1.4         Lab 02/18/24  2222   SODIUM 137   POTASSIUM 3.9   CHLORIDE 104   CO2 23.0   ANION GAP 10.0   BUN 12   CREATININE 0.76   EGFR 84.4   GLUCOSE 137*   CALCIUM 8.9   MAGNESIUM 1.8         Lab 02/18/24  2222   TOTAL PROTEIN 7.2   ALBUMIN 3.6   GLOBULIN 3.6   ALT (SGPT) 10   AST (SGOT) 18   BILIRUBIN 0.2   ALK PHOS 116         Lab 02/18/24  2222   HSTROP T 9                 UA          2/18/2024    23:58   Urinalysis   Squamous Epithelial Cells, UA 3-6    Specific Gravity, UA 1.019    Ketones, UA Negative    Blood, UA Trace    Leukocytes, UA Trace    Nitrite, UA Negative    RBC, UA 21-50    WBC, UA 3-5    Bacteria, UA  1+        Microbiology Results (last 10 days)       Procedure Component Value - Date/Time    COVID PRE-OP / PRE-PROCEDURE SCREENING ORDER (NO ISOLATION) - Swab, Nasopharynx [[id number]]  (Abnormal) Collected: 02/18/24 2252    Lab Status: Final result Specimen: Swab from Nasopharynx Updated: 02/18/24 2334    Narrative:      The following orders were created for panel order COVID PRE-OP / PRE-PROCEDURE SCREENING ORDER (NO ISOLATION) - Swab, Nasopharynx.  Procedure                               Abnormality         Status                     ---------                               -----------         ------                     COVID-19 and FLU A/B PCR...[[id number]]  Abnormal            Final result                 Please view results for these tests on the individual orders.    COVID-19 and FLU A/B PCR, 1 HR TAT - Swab, Nasopharynx [[id number]]  (Abnormal) Collected: 02/18/24 2252    Lab Status: Final result Specimen: Swab from Nasopharynx Updated: 02/18/24 2334     COVID19 Detected     Influenza A PCR Not Detected     Influenza B PCR Not Detected    Narrative:      Fact sheet for providers: https://www.fda.gov/media/059467/download    Fact sheet for patients: https://www.fda.gov/media/857560/download    Test performed by PCR.  Influenza A and Influenza B negative results should be considered presumptive in samples that have a positive SARS-CoV-2 result.    Competitive inhibition studies showed that SARS-CoV-2 virus, when present at concentrations above 3.6E+04 copies/mL, can inhibit the detection and amplification of influenza A and influenza B virus RNA if present at or below 1.8E+02 copies/mL or 4.9E+02 copies/mL, respectively, and may lead to false negative influenza virus results. If co-infection with influenza A or influenza B virus is suspected in samples with a positive SARS-CoV-2 result, the sample should be re-tested with another FDA cleared, approved, or authorized influenza test, if influenza virus detection  would change clinical management.            XR Chest 1 View    Result Date: 2/18/2024  XR CHEST 1 VW Date of Exam: 2/18/2024 10:28 PM EST Indication: Weak/Dizzy/AMS triage protocol Comparison: None available. Findings: Mild patchy airspace disease seen within the left lower lobe. May be a small left-sided pleural effusion. No pneumothorax. The pulmonary vasculature appears within normal limits. The cardiac and mediastinal silhouette appear unremarkable. No acute osseous abnormality identified.     Impression: Impression: Mild left lower lobe pneumonia with possible small left-sided pleural effusion. Electronically Signed: Celsa Hall MD  2/18/2024 10:53 PM EST  Workstation ID: [id number]         Assessment & Plan   Assessment & Plan       COVID-19      69 F with COVID-19    COVID-19  Left lower lobe infiltrate on chest x-ray  - She is not requiring any supplemental oxygen, saturations good on room air; therefore we will defer starting dexamethasone/remdesivir or Paxlovid at this time.  - Can add O2 by nasal cannula if required.  - Albuterol inhaler as needed.  - Monitor inflammatory biomarkers.  - IV antibiotic coverage for any possible bacterial component of her pneumonia was started in the ED, and will be continued.  - Will add D-dimer to labs; if elevated will order CTA chest.    Hypertension  - Continue lisinopril from home regimen.    Type 2 diabetes  - SSI coverage with AC/at bedtime fingersticks, also hypoglycemia coverage.  - Hold metformin from home regimen for now.        Total time spent: 78 minutes  Time spent includes time reviewing chart, face-to-face time, counseling patient/family/caregiver, ordering medications/tests/procedures, communicating with other health care professionals, documenting clinical information in the electronic health record, and coordination of care.       DVT prophylaxis:  Medical DVT prophylaxis orders are present.          CODE STATUS:    Code Status and Medical Interventions:    Ordered at: 24 0230     Level Of Support Discussed With:    Patient     Code Status (Patient has no pulse and is not breathing):    CPR (Attempt to Resuscitate)     Medical Interventions (Patient has pulse or is breathing):    Full Support       Expected Discharge   Expected discharge date/ time has not been documented.     Cruz Lim III, DO  24     Electronically signed by Cruz Lim III, DO at 24 0413          Physical Therapy Notes (most recent note)        Aruna Rose at 24 1326  Version 2 of 2         Patient Name: Lizbet Flores  : 1955    MRN: [id number]                              Today's Date: 2024       Admit Date: 2024    Visit Dx:     ICD-10-CM ICD-9-CM   1. COVID-19 virus infection  U07.1 079.89   2. Pneumonia of left lower lobe due to infectious organism  J18.9 486   3. Generalized weakness  R53.1 780.79   4. Unable to ambulate  R26.2 719.7     Patient Active Problem List   Diagnosis    COVID-19    HTN (hypertension)    DMII (diabetes mellitus, type 2)     Past Medical History:   Diagnosis Date    Hypertension      History reviewed. No pertinent surgical history.   General Information       Row Name 24 1411          Physical Therapy Time and Intention    Document Type evaluation  -ML     Mode of Treatment physical therapy  -ML       Row Name 24 1411          General Information    Patient Profile Reviewed yes  -ML     Prior Level of Function independent:;all household mobility;community mobility;gait;transfer;bed mobility;ADL's;home management;driving;shopping  patient reports not ambulatin with AD  -ML     Existing Precautions/Restrictions fall  -ML     Barriers to Rehab none identified  -ML       Row Name 24 1411          Living Environment    People in Home spouse  patient's spouse is currently at rehab, 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  -ML       Row Name 24 1411          Home Main Entrance     Number of Stairs, Main Entrance other (see comments)  ramp  -ML       Row Name 02/19/24 1411          Stairs Within Home, Primary    Number of Stairs, Within Home, Primary none  -ML       Row Name 02/19/24 1411          Cognition    Orientation Status (Cognition) oriented x 3  -ML       Row Name 02/19/24 1411          Safety Issues, Functional Mobility    Safety Issues Affecting Function (Mobility) awareness of need for assistance;insight into deficits/self-awareness;safety precaution awareness;safety precautions follow-through/compliance;sequencing abilities  -ML     Impairments Affecting Function (Mobility) balance;endurance/activity tolerance;strength  -ML               User Key  (r) = Recorded By, (t) = Taken By, (c) = Cosigned By      Initials Name Provider Type    ML Aruna Rose Physical Therapist                   Mobility       Row Name 02/19/24 1413          Bed Mobility    Bed Mobility supine-sit  -ML     Supine-Sit [place] (Bed Mobility) standby assist  -ML     Assistive Device (Bed Mobility) bed rails;head of bed elevated  -ML       Row Name 02/19/24 1413          Bed-Chair Transfer    Bed-Chair [place] (Transfers) contact guard  -ML     Assistive Device (Bed-Chair Transfers) other (see comments)  no AD  -ML     Comment, (Bed-Chair Transfer) patient completed stand pivot transfer from chair to BSC, BSC to chair  -ML       Row Name 02/19/24 1413          Sit-Stand Transfer    Sit-Stand [place] (Transfers) contact guard  -ML     Assistive Device (Sit-Stand Transfers) other (see comments)  no AD  -ML       Row Name 02/19/24 1413          Gait/Stairs (Locomotion)    [place] Level (Gait) contact guard;verbal cues  hand held assist  -ML     Assistive Device (Gait) other (see comments)  hand held assist  -ML     Distance in Feet (Gait) 5  -ML     Deviations/Abnormal Patterns (Gait) bilateral deviations;adam decreased;festinating/shuffling;gait speed decreased;stride length  decreased;weight shifting decreased  -ML     Bilateral Gait Deviations forward flexed posture;heel strike decreased  -ML     Comment, (Gait/Stairs) Patient declined use of RW, however, reaching for bed/chair during ambulation.  -ML               User Key  (r) = Recorded By, (t) = Taken By, (c) = Cosigned By      Initials Name Provider Type    ML Aruna Rose Physical Therapist                   Obj/Interventions       Row Name 02/19/24 1415          Range of Motion Comprehensive    General Range of Motion bilateral lower extremity ROM WFL  -ML       Row Name 02/19/24 1415          Strength Comprehensive (MMT)    General Manual Muscle Testing (MMT) Assessment lower extremity strength deficits identified  -ML     Comment, General Manual Muscle Testing (MMT) Assessment BLE grossly 4/5  -ML       Row Name 02/19/24 1415          Balance    Balance Assessment sitting static balance;sitting dynamic balance;sit to stand dynamic balance;standing static balance;standing dynamic balance  -ML     Static Sitting Balance standby assist  -ML     Dynamic Sitting Balance standby assist  -ML     Position, Sitting Balance unsupported;sitting edge of bed  -ML     Sit to Stand Dynamic Balance verbal cues;contact guard  -ML     Static Standing Balance contact guard  -ML     Dynamic Standing Balance contact guard  -ML     Position/Device Used, Standing Balance supported  -ML     Balance Interventions sitting;standing;sit to stand;supported;occupation based/functional task  -ML       Row Name 02/19/24 1415          Sensory Assessment (Somatosensory)    Sensory Assessment (Somatosensory) LE sensation intact  -ML               User Key  (r) = Recorded By, (t) = Taken By, (c) = Cosigned By      Initials Name Provider Type    ML Aruna Rose Physical Therapist                   Goals/Plan       Row Name 02/19/24 1423          Bed Mobility Goal 1 (PT)    Activity/Assistive Device (Bed Mobility Goal 1, PT) sit to supine;supine to sit  -ML      [place] Level/Cues Needed (Bed Mobility Goal 1, PT) independent  -ML     Time Frame (Bed Mobility Goal 1, PT) 10 days  -ML       Row Name 02/19/24 1423          Transfer Goal 1 (PT)    Activity/Assistive Device (Transfer Goal 1, PT) sit-to-stand/stand-to-sit;bed-to-chair/chair-to-bed;walker, rolling  -ML     [place] Level/Cues Needed (Transfer Goal 1, PT) modified independence  -ML     Time Frame (Transfer Goal 1, PT) 10 days  -ML       Row Name 02/19/24 1423          Gait Training Goal 1 (PT)    Activity/Assistive Device (Gait Training Goal 1, PT) gait (walking locomotion);assistive device use;decrease fall risk;improve balance and speed;increase endurance/gait distance;walker, rolling  -ML     [place] Level (Gait Training Goal 1, PT) modified independence  -ML     Distance (Gait Training Goal 1, PT) 100  -ML     Time Frame (Gait Training Goal 1, PT) 10 days  -ML       Row Name 02/19/24 1423          Therapy Assessment/Plan (PT)    Planned Therapy Interventions (PT) balance training;bed mobility training;gait training;home exercise program;neuromuscular re-education;patient/family education;postural re-education;ROM (range of motion);strengthening;stretching;transfer training  -ML               User Key  (r) = Recorded By, (t) = Taken By, (c) = Cosigned By      Initials Name Provider Type    ML Aruna Rose Physical Therapist                   Clinical Impression       Row Name 02/19/24 1421          Pain    Pretreatment Pain Rating 0/10 - no pain  -ML     Posttreatment Pain Rating 0/10 - no pain  -ML       Row Name 02/19/24 1421          Plan of Care Review    Plan of Care Reviewed With patient  -ML     Outcome Evaluation Patient ambulates with shuffled gait, reaching for furniture, patient declined use of RW despite education. Patient currently presents below baseline for mobility and would continue to benefit from skilled PT to address strength, balance and activity tolerance deficits.  -ML        Row Name 02/19/24 1421          Therapy Assessment/Plan (PT)    Rehab Potential (PT) good, to achieve stated therapy goals  -ML     Criteria for Skilled Interventions Met (PT) yes;meets criteria;skilled treatment is necessary  -ML     Therapy Frequency (PT) daily  -ML       Row Name 02/19/24 1421          Vital Signs    Pre Patient Position Supine  -ML     Intra Patient Position Standing  -ML     Post Patient Position Sitting  -ML       Row Name 02/19/24 1421          Positioning and Restraints    Pre-Treatment Position in bed  -ML     Post Treatment Position chair  -ML     In Chair notified nsg;reclined;call light within reach;encouraged to call for assist;exit alarm on;waffle cushion;legs elevated  -ML               User Key  (r) = Recorded By, (t) = Taken By, (c) = Cosigned By      Initials Name Provider Type    Aruna Martínez Physical Therapist                   Outcome Measures       Row Name 02/19/24 1423          How much help from another person do you currently need...    Turning from your back to your side while in flat bed without using bedrails? 3  -ML     Moving from lying on back to sitting on the side of a flat bed without bedrails? 3  -ML     Moving to and from a bed to a chair (including a wheelchair)? 3  -ML     Standing up from a chair using your arms (e.g., wheelchair, bedside chair)? 3  -ML     Climbing 3-5 steps with a railing? 3  -ML     To walk in hospital room? 3  -ML     AM-PAC 6 Clicks Score (PT) 18  -ML     Highest Level of Mobility Goal 6 --> Walk 10 steps or more  -ML       Row Name 02/19/24 1423 02/19/24 0906       Functional Assessment    Outcome Measure Options AM-PAC 6 Clicks Basic Mobility (PT)  -ML AM-PAC 6 Clicks Daily Activity (OT)  -AF              User Key  (r) = Recorded By, (t) = Taken By, (c) = Cosigned By      Initials Name Provider Type    Aruna Martínez Physical Therapist    Isis Lowry OT Occupational Therapist                                 Physical Therapy  Education       Title: PT OT SLP Therapies (In Progress)       Topic: Physical Therapy (In Progress)       Point: Mobility training (Done)       Learning Progress Summary             Patient Acceptance, E, VU,NR by  at 2/19/2024 1424                         Point: Home exercise program (Not Started)       Learner Progress:  Not documented in this visit.              Point: Body mechanics (Done)       Learning Progress Summary             Patient Acceptance, E, VU,NR by ML at 2/19/2024 1424                         Point: Precautions (Done)       Learning Progress Summary             Patient Acceptance, E, VU,NR by  at 2/19/2024 1424                                         User Key       Initials Effective Dates Name Provider Type Discipline     04/22/21 -  Aruna Rose Physical Therapist PT                  PT Recommendation and Plan  Planned Therapy Interventions (PT): balance training, bed mobility training, gait training, home exercise program, neuromuscular re-education, patient/family education, postural re-education, ROM (range of motion), strengthening, stretching, transfer training  Plan of Care Reviewed With: patient  Outcome Evaluation: Patient ambulates with shuffled gait, reaching for furniture, patient declined use of RW despite education. Patient currently presents below baseline for mobility and would continue to benefit from skilled PT to address strength, balance and activity tolerance deficits.     Time Calculation:   PT Evaluation Complexity  History, PT Evaluation Complexity: 1-2 personal factors and/or comorbidities  Examination of Body Systems (PT Eval Complexity): 1-2 elements  Clinical Presentation (PT Evaluation Complexity): evolving  Clinical Decision Making (PT Evaluation Complexity): low complexity  Overall Complexity (PT Evaluation Complexity): low complexity     PT Charges       Row Name 02/19/24 1427             Time Calculation    Start Time 1326  -ML      PT Received On 02/19/24   -ML      PT Goal Re-Cert Due Date 24  -ML         Timed Charges    96077 - PT Therapeutic Activity Minutes 10  -ML         Untimed Charges    PT Eval/Re-eval Minutes 46  -ML         Total Minutes    Timed Charges Total Minutes 10  -ML      Untimed Charges Total Minutes 46  -ML       Total Minutes 56  -ML                User Key  (r) = Recorded By, (t) = Taken By, (c) = Cosigned By      Initials Name Provider Type    ML Aruna Rose Physical Therapist                  Therapy Charges for Today       Code Description Service Date Service Provider Modifiers Qty    06296421277 HC PT THERAPEUTIC ACT EA 15 MIN 2024 Aruna Rose GP 1    99666688291 HC PT EVAL LOW COMPLEXITY 4 2024 Aruna Rose GP 1            PT G-Codes  Outcome Measure Options: AM-PAC 6 Clicks Basic Mobility (PT)  AM-PAC 6 Clicks Score (PT): 18  AM-PAC 6 Clicks Score (OT): 15  PT Discharge Summary  Anticipated Discharge Disposition (PT): inpatient rehabilitation facility    Aruna Rose  2024      Electronically signed by Aruna Rose at 24 1508       Aruna Rose at 24 1326  Version 1 of 2         Patient Name: Lizbet Flores  : 1955    MRN: [id number]                              Today's Date: 2024       Admit Date: 2024    Visit Dx:     ICD-10-CM ICD-9-CM   1. COVID-19 virus infection  U07.1 079.89   2. Pneumonia of left lower lobe due to infectious organism  J18.9 486   3. Generalized weakness  R53.1 780.79   4. Unable to ambulate  R26.2 719.7     Patient Active Problem List   Diagnosis    COVID-19    HTN (hypertension)    DMII (diabetes mellitus, type 2)     Past Medical History:   Diagnosis Date    Hypertension      History reviewed. No pertinent surgical history.   General Information       Row Name 24 1411          Physical Therapy Time and Intention    Document Type evaluation  -ML     Mode of Treatment physical therapy  -ML       Row Name 24 1411          General Information     Patient Profile Reviewed yes  -ML     Prior Level of Function independent:;all household mobility;community mobility;gait;transfer;bed mobility;ADL's;home management;driving;shopping  patient reports not ambulatin with AD  -ML     Existing Precautions/Restrictions fall  -ML     Barriers to Rehab none identified  -ML       Row Name 02/19/24 1411          Living Environment    People in Home spouse  patient's spouse is currently at rehab, 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  -ML       Row Name 02/19/24 1411          Home Main Entrance    Number of Stairs, Main Entrance other (see comments)  ramp  -ML       Row Name 02/19/24 1411          Stairs Within Home, Primary    Number of Stairs, Within Home, Primary none  -ML       Row Name 02/19/24 1411          Cognition    Orientation Status (Cognition) oriented x 3  -ML       Row Name 02/19/24 1411          Safety Issues, Functional Mobility    Safety Issues Affecting Function (Mobility) awareness of need for assistance;insight into deficits/self-awareness;safety precaution awareness;safety precautions follow-through/compliance;sequencing abilities  -ML     Impairments Affecting Function (Mobility) balance;endurance/activity tolerance;strength  -ML               User Key  (r) = Recorded By, (t) = Taken By, (c) = Cosigned By      Initials Name Provider Type    ML Aruna Rose Physical Therapist                   Mobility       Row Name 02/19/24 1413          Bed Mobility    Bed Mobility supine-sit  -ML     Supine-Sit [place] (Bed Mobility) standby assist  -ML     Assistive Device (Bed Mobility) bed rails;head of bed elevated  -ML       Row Name 02/19/24 1413          Bed-Chair Transfer    Bed-Chair [place] (Transfers) contact guard  -ML     Assistive Device (Bed-Chair Transfers) other (see comments)  no AD  -ML     Comment, (Bed-Chair Transfer) patient completed stand pivot transfer from chair to BSC, BSC to chair  -ML       Row Name 02/19/24 1413           Sit-Stand Transfer    Sit-Stand [place] (Transfers) contact guard  -ML     Assistive Device (Sit-Stand Transfers) other (see comments)  no AD  -ML       Row Name 02/19/24 1413          Gait/Stairs (Locomotion)    [place] Level (Gait) contact guard;verbal cues  hand held assist  -ML     Assistive Device (Gait) other (see comments)  hand held assist  -ML     Distance in Feet (Gait) 5  -ML     Deviations/Abnormal Patterns (Gait) bilateral deviations;adam decreased;festinating/shuffling;gait speed decreased;stride length decreased;weight shifting decreased  -ML     Bilateral Gait Deviations forward flexed posture;heel strike decreased  -ML     Comment, (Gait/Stairs) Patient declined use of RW, however, reaching for bed/chair during ambulation.  -ML               User Key  (r) = Recorded By, (t) = Taken By, (c) = Cosigned By      Initials Name Provider Type    ML Aruna Rose Physical Therapist                   Obj/Interventions       Row Name 02/19/24 1415          Range of Motion Comprehensive    General Range of Motion bilateral lower extremity ROM WFL  -ML       Row Name 02/19/24 1415          Strength Comprehensive (MMT)    General Manual Muscle Testing (MMT) Assessment lower extremity strength deficits identified  -ML     Comment, General Manual Muscle Testing (MMT) Assessment BLE grossly 4/5  -ML       Row Name 02/19/24 1415          Balance    Balance Assessment sitting static balance;sitting dynamic balance;sit to stand dynamic balance;standing static balance;standing dynamic balance  -ML     Static Sitting Balance standby assist  -ML     Dynamic Sitting Balance standby assist  -ML     Position, Sitting Balance unsupported;sitting edge of bed  -ML     Sit to Stand Dynamic Balance verbal cues;contact guard  -ML     Static Standing Balance contact guard  -ML     Dynamic Standing Balance contact guard  -ML     Position/Device Used, Standing Balance supported  -ML     Balance Interventions  sitting;standing;sit to stand;supported;occupation based/functional task  -ML       Row Name 02/19/24 1415          Sensory Assessment (Somatosensory)    Sensory Assessment (Somatosensory) LE sensation intact  -ML               User Key  (r) = Recorded By, (t) = Taken By, (c) = Cosigned By      Initials Name Provider Type    Aruna Martínez Physical Therapist                   Goals/Plan       Row Name 02/19/24 1423          Bed Mobility Goal 1 (PT)    Activity/Assistive Device (Bed Mobility Goal 1, PT) sit to supine;supine to sit  -ML     [place] Level/Cues Needed (Bed Mobility Goal 1, PT) independent  -ML     Time Frame (Bed Mobility Goal 1, PT) 10 days  -ML       Row Name 02/19/24 1423          Transfer Goal 1 (PT)    Activity/Assistive Device (Transfer Goal 1, PT) sit-to-stand/stand-to-sit;bed-to-chair/chair-to-bed;walker, rolling  -ML     [place] Level/Cues Needed (Transfer Goal 1, PT) modified independence  -ML     Time Frame (Transfer Goal 1, PT) 10 days  -ML       Row Name 02/19/24 1423          Gait Training Goal 1 (PT)    Activity/Assistive Device (Gait Training Goal 1, PT) gait (walking locomotion);assistive device use;decrease fall risk;improve balance and speed;increase endurance/gait distance;walker, rolling  -ML     [place] Level (Gait Training Goal 1, PT) modified independence  -ML     Distance (Gait Training Goal 1, PT) 100  -ML     Time Frame (Gait Training Goal 1, PT) 10 days  -ML       [facility] Name 02/19/24 1423          Therapy Assessment/Plan (PT)    Planned Therapy Interventions (PT) balance training;bed mobility training;gait training;home exercise program;neuromuscular re-education;patient/family education;postural re-education;ROM (range of motion);strengthening;stretching;transfer training  -ML               User Key  (r) = Recorded By, (t) = Taken By, (c) = Cosigned By      Initials Name Provider Type    Aruna Martínez Physical Therapist                   Clinical Impression        Row Name 02/19/24 1421          Pain    Pretreatment Pain Rating 0/10 - no pain  -ML     Posttreatment Pain Rating 0/10 - no pain  -ML       Row Name 02/19/24 1421          Plan of Care Review    Plan of Care Reviewed With patient  -ML     Outcome Evaluation Patient ambulates with shuffled gait, reaching for furniture, patient declined use of RW despite education. Patient currently presents below baseline for mobility and would continue to benefit from skilled PT to address strength, balance and activity tolerance deficits.  -ML       Row Name 02/19/24 1421          Vital Signs    Pre Patient Position Supine  -ML     Intra Patient Position Standing  -ML     Post Patient Position Sitting  -ML       Row Name 02/19/24 1421          Positioning and Restraints    Pre-Treatment Position in bed  -ML     Post Treatment Position chair  -ML     In Chair notified nsg;reclined;call light within reach;encouraged to call for assist;exit alarm on;waffle cushion;legs elevated  -ML               User Key  (r) = Recorded By, (t) = Taken By, (c) = Cosigned By      Initials Name Provider Type    ML Aruna Rose Physical Therapist                   Outcome Measures       Row Name 02/19/24 1423 02/19/24 0246       How much help from another person do you currently need...    Turning from your back to your side while in flat bed without using bedrails? 3  -ML 3  -OM    Moving from lying on back to sitting on the side of a flat bed without bedrails? 3  -ML 3  -OM    Moving to and from a bed to a chair (including a wheelchair)? 3  -ML 3  -OM    Standing up from a chair using your arms (e.g., wheelchair, bedside chair)? 3  -ML 2  -OM    Climbing 3-5 steps with a railing? 3  -ML 2  -OM    To walk in hospital room? 3  -ML 2  -OM    AM-PAC 6 Clicks Score (PT) 18  -ML 15  -OM    Highest Level of Mobility Goal 6 --> Walk 10 steps or more  -ML 4 --> Transfer to chair/commode  -OM      Row Name 02/19/24 1423 02/19/24 0906       Functional  Assessment    Outcome Measure Options AM-PAC 6 Clicks Basic Mobility (PT)  -ML AM-PAC 6 Clicks Daily Activity (OT)  -AF              User Key  (r) = Recorded By, (t) = Taken By, (c) = Cosigned By      Initials Name Provider Type    Gayle Solares, RN Registered Nurse     Aruna Rose Physical Therapist    AF Isis Spencer OT Occupational Therapist                                 Physical Therapy Education       Title: PT OT SLP Therapies (In Progress)       Topic: Physical Therapy (In Progress)       Point: Mobility training (Done)       Learning Progress Summary             Patient Acceptance, E, VU,NR by  at 2/19/2024 1424                         Point: Home exercise program (Not Started)       Learner Progress:  Not documented in this visit.              Point: Body mechanics (Done)       Learning Progress Summary             Patient Acceptance, E, VU,NR by  at 2/19/2024 1424                         Point: Precautions (Done)       Learning Progress Summary             Patient Acceptance, E, VU,NR by  at 2/19/2024 1424                                         User Key       Initials Effective Dates Name Provider Type Discipline     04/22/21 -  Aruna Rose Physical Therapist PT                  PT Recommendation and Plan  Planned Therapy Interventions (PT): balance training, bed mobility training, gait training, home exercise program, neuromuscular re-education, patient/family education, postural re-education, ROM (range of motion), strengthening, stretching, transfer training  Plan of Care Reviewed With: patient  Outcome Evaluation: Patient ambulates with shuffled gait, reaching for furniture, patient declined use of RW despite education. Patient currently presents below baseline for mobility and would continue to benefit from skilled PT to address strength, balance and activity tolerance deficits.     Time Calculation:   PT Evaluation Complexity  History, PT Evaluation Complexity: 1-2 personal  factors and/or comorbidities  Examination of Body Systems (PT Eval Complexity): 1-2 elements  Clinical Presentation (PT Evaluation Complexity): evolving  Clinical Decision Making (PT Evaluation Complexity): low complexity  Overall Complexity (PT Evaluation Complexity): low complexity     PT Charges       Row Name 02/19/24 1425             Time Calculation    Start Time 1326  -ML      PT Received On 02/19/24  -ML      PT Goal Re-Cert Due Date 02/29/24  -ML         Timed Charges    94715 - PT Therapeutic Activity Minutes 10  -ML         Untimed Charges    PT Eval/Re-eval Minutes 46  -ML         Total Minutes    Timed Charges Total Minutes 10  -ML      Untimed Charges Total Minutes 46  -ML       Total Minutes 56  -ML                User Key  (r) = Recorded By, (t) = Taken By, (c) = Cosigned By      Initials Name Provider Type    Aruna Martínez Physical Therapist                  Therapy Charges for Today       Code Description Service Date Service Provider Modifiers Qty    39734121496 HC PT THERAPEUTIC ACT EA 15 MIN 2/19/2024 Aruna Rose GP 1    69095214910 HC PT EVAL LOW COMPLEXITY 4 2/19/2024 Aruna Rose GP 1            PT G-Codes  Outcome Measure Options: AM-PAC 6 Clicks Basic Mobility (PT)  AM-PAC 6 Clicks Score (PT): 18  AM-PAC 6 Clicks Score (OT): 15  PT Discharge Summary  Anticipated Discharge Disposition (PT): inpatient rehabilitation facility    Aruna Rose  2/19/2024      Electronically signed by Aruna Rose at 02/19/24 1420

## 2024-02-21 NOTE — CASE MANAGEMENT/SOCIAL WORK
Case Management Discharge Note      Final Note: Patient is discharging today. Spoke to daughter in law, Abimbola. Ms. Flores is not agreeable to IRF, but is agreeable to outpatient PT at [facility] at the [facility] location. Order is in [facility] and was faxed to [facility] at [phone]. Family will transport patient home today via private vehicle. No other needs noted.         Selected Continued Care - Admitted Since 2/18/2024       Destination    No services have been selected for the patient.                Durable Medical Equipment    No services have been selected for the patient.                Dialysis/Infusion    No services have been selected for the patient.                Home Medical Care    No services have been selected for the patient.                Therapy       Service Provider Selected Services Address Phone Fax Patient Preferred    [facility] [address] [address] [phone] --              Community Resources    No services have been selected for the patient.                Community & DME    No services have been selected for the patient.                    Transportation Services  Private: Car    Final Discharge Disposition Code: 01 - home or self-care

## 2024-02-21 NOTE — THERAPY TREATMENT NOTE
Patient Name: Lizbet Flores  : 1955    MRN: [id number]                              Today's Date: 2024       Admit Date: 2024    Visit Dx:     ICD-10-CM ICD-9-CM   1. COVID-19 virus infection  U07.1 079.89   2. Pneumonia of left lower lobe due to infectious organism  J18.9 486   3. Generalized weakness  R53.1 780.79   4. Unable to ambulate  R26.2 719.7     Patient Active Problem List   Diagnosis    COVID-19    HTN (hypertension)    DMII (diabetes mellitus, type 2)    Cytokine release syndrome, grade 1     Past Medical History:   Diagnosis Date    Hypertension      History reviewed. No pertinent surgical history.   General Information       Row Name 24 1142          Physical Therapy Time and Intention    Document Type therapy note (daily note)  -KE     Mode of Treatment physical therapy  -KE       Row Name 24 1142          General Information    Patient Profile Reviewed yes  -KE     Existing Precautions/Restrictions fall  -KE     Barriers to Rehab none identified  -KE       Row Name 24 1142          Cognition    Orientation Status (Cognition) oriented x 3  -KE       Row Name 24 1142          Safety Issues, Functional Mobility    Safety Issues Affecting Function (Mobility) awareness of need for assistance;insight into deficits/self-awareness;safety precaution awareness;safety precautions follow-through/compliance;sequencing abilities  -KE     Impairments Affecting Function (Mobility) balance;endurance/activity tolerance;strength  -KE               User Key  (r) = Recorded By, (t) = Taken By, (c) = Cosigned By      Initials Name Provider Type    Margie Em, PT Physical Therapist                   Mobility       Row Name 24 1142          Bed Mobility    Bed Mobility supine-sit  -KE     Supine-Sit [place] (Bed Mobility) standby assist  -KE     Assistive Device (Bed Mobility) bed rails;head of bed elevated  -ELVIS     Comment, (Bed Mobility) VCs for sequencing,  pt req increased time and effort to complete  -KE       Row Name 02/21/24 1142          Transfers    Comment, (Transfers) x1 STS from EOB, x1 STS from chair  -KE       Row Name 02/21/24 1142          Bed-Chair Transfer    Bed-Chair [place] (Transfers) contact guard  -KE     Assistive Device (Bed-Chair Transfers) other (see comments)  IV pole for support  -KE       Row Name 02/21/24 1142          Sit-Stand Transfer    Sit-Stand [place] (Transfers) contact guard;1 person assist;verbal cues  -KE     Comment, (Sit-Stand Transfer) bilat UE push from surface  -KE       Row Name 02/21/24 1142          Gait/Stairs (Locomotion)    [place] Level (Gait) contact guard;verbal cues  -KE     Assistive Device (Gait) other (see comments)  IV pole for support  -KE     Distance in Feet (Gait) 5'+40'  -KE     Deviations/Abnormal Patterns (Gait) bilateral deviations;adam decreased;festinating/shuffling;gait speed decreased;stride length decreased;weight shifting decreased  -KE     Bilateral Gait Deviations forward flexed posture;heel strike decreased  -KE     Comment, (Gait/Stairs) Pt declining use of FWW, reaching out for IV pole for support. Demo shuffling steps with forward flexed posture and slow gait speed. Further distances limited by fatigue.  -KE               User Key  (r) = Recorded By, (t) = Taken By, (c) = Cosigned By      Initials Name Provider Type    Margie Em, PT Physical Therapist                   Obj/Interventions       Row Name 02/21/24 1145          Balance    Balance Assessment sitting static balance;sitting dynamic balance;sit to stand dynamic balance;standing static balance;standing dynamic balance  -KE     Static Sitting Balance standby assist  -KE     Dynamic Sitting Balance standby assist  -KE     Position, Sitting Balance unsupported;sitting edge of bed  -KE     Sit to Stand Dynamic Balance contact guard;1-person assist;verbal cues  -KE     Static Standing Balance contact guard   -KE     Dynamic Standing Balance contact guard  -KE     Position/Device Used, Standing Balance supported;other (see comments)  IV pole  -KE     Balance Interventions sitting;standing;sit to stand;supported;dynamic;static  -KE     Comment, Balance no overt LOB  -KE               User Key  (r) = Recorded By, (t) = Taken By, (c) = Cosigned By      Initials Name Provider Type    Margie Em, PT Physical Therapist                   Goals/Plan    No documentation.                  Clinical Impression       Row Name 02/21/24 1148          Pain    Pretreatment Pain Rating 0/10 - no pain  -KE     Posttreatment Pain Rating 0/10 - no pain  -KE     Pain Intervention(s) Ambulation/increased activity;Repositioned  -KE       Row Name 02/21/24 1148          Plan of Care Review    Plan of Care Reviewed With patient  -KE     Progress improving  -KE     Outcome Evaluation Pt increasing distances for gait training, ambulating 5'+40' w/ CGA. Pt continues to present with weakness, balance deficits, and activity tolerance below baseline. Plan to continue progressing current PT POC as tolerated.  -KE       Row Name 02/21/24 1148          Vital Signs    Pre Systolic BP Rehab 145  -KE     Pre Treatment Diastolic BP 93  -KE     Pretreatment Heart Rate (beats/min) 80  -KE     Posttreatment Heart Rate (beats/min) 83  -KE     Pre SpO2 (%) 97  -KE     O2 Delivery Pre Treatment room air  -KE     O2 Delivery Intra Treatment room air  -KE     Post SpO2 (%) 97  -KE     O2 Delivery Post Treatment room air  -KE     Pre Patient Position Supine  -KE     Intra Patient Position Standing  -KE     Post Patient Position Sitting  -KE       Row Name 02/21/24 1148          Positioning and Restraints    Pre-Treatment Position in bed  -KE     Post Treatment Position chair  -KE     In Chair notified nsg;reclined;call light within reach;encouraged to call for assist;exit alarm on;waffle cushion  -KE               User Key  (r) = Recorded By, (t) = Taken By,  (c) = Cosigned By      Initials Name Provider Type    Margie Em, JIE Physical Therapist                   Outcome Measures       Row Name 02/21/24 1150          How much help from another person do you currently need...    Turning from your back to your side while in flat bed without using bedrails? 4  -KE     Moving from lying on back to sitting on the side of a flat bed without bedrails? 3  -KE     Moving to and from a bed to a chair (including a wheelchair)? 3  -KE     Standing up from a chair using your arms (e.g., wheelchair, bedside chair)? 3  -KE     Climbing 3-5 steps with a railing? 2  -KE     To walk in hospital room? 3  -KE     AM-PAC 6 Clicks Score (PT) 18  -KE     Highest Level of Mobility Goal 6 --> Walk 10 steps or more  -KE       Row Name 02/21/24 1150          Functional Assessment    Outcome Measure Options AM-PAC 6 Clicks Basic Mobility (PT)  -               User Key  (r) = Recorded By, (t) = Taken By, (c) = Cosigned By      Initials Name Provider Type    Margie Em PT Physical Therapist                                 Physical Therapy Education       Title: PT OT SLP Therapies (Resolved)       Topic: Physical Therapy (Resolved)       Point: Mobility training (Resolved)       Learning Progress Summary             Patient Acceptance, E, VU,NR by  at 2/19/2024 1424                         Point: Home exercise program (Resolved)       Learner Progress:  Not documented in this visit.              Point: Body mechanics (Resolved)       Learning Progress Summary             Patient Acceptance, E, VU,NR by ML at 2/19/2024 1424                         Point: Precautions (Resolved)       Learning Progress Summary             Patient Acceptance, E, VU,NR by  at 2/19/2024 1424                                         User Key       Initials Effective Dates Name Provider Type Discipline     04/22/21 -  Aruna Rose Physical Therapist PT                  PT Recommendation and Plan      Plan of Care Reviewed With: patient  Progress: improving  Outcome Evaluation: Pt increasing distances for gait training, ambulating 5'+40' w/ CGA. Pt continues to present with weakness, balance deficits, and activity tolerance below baseline. Plan to continue progressing current PT POC as tolerated.     Time Calculation:         PT Charges       Row Name 02/21/24 1150             Time Calculation    Start Time 1115  -KE      PT Received On 02/21/24  -KE      PT Goal Re-Cert Due Date 02/29/24  -KE         Timed Charges    13264 - PT Therapeutic Activity Minutes 24  -KE         Total Minutes    Timed Charges Total Minutes 24  -KE       Total Minutes 24  -KE                User Key  (r) = Recorded By, (t) = Taken By, (c) = Cosigned By      Initials Name Provider Type    Margie Em PT Physical Therapist                  Therapy Charges for Today       Code Description Service Date Service Provider Modifiers Qty    83641698339  PT THERAPEUTIC ACT EA 15 MIN 2/21/2024 Margie Coto PT GP 2            PT G-Codes  Outcome Measure Options: AM-PAC 6 Clicks Basic Mobility (PT)  AM-PAC 6 Clicks Score (PT): 18  AM-PAC 6 Clicks Score (OT): 15  PT Discharge Summary  Anticipated Discharge Disposition (PT): inpatient rehabilitation facility    Margie Coto PT  2/21/2024

## 2024-02-21 NOTE — PLAN OF CARE
Goal Outcome Evaluation:  Plan of Care Reviewed With: patient        Progress: no change  Outcome Evaluation: NSR on tele. One time dose hydralzine ordered for . SBP has remained less than 170. Patient rested without complaints this shift.

## 2024-02-21 NOTE — DISCHARGE SUMMARY
[facility] Medicine Services  DISCHARGE SUMMARY    Patient Name: Lizbet Flores  : 1955  MRN: [id number]    Date of Admission: 2024 10:17 PM  Date of Discharge:  2024  Primary Care Physician: Sonny Guzman MD    Consults       No orders found from 2024 to 2024.            Hospital Course         Active Hospital Problems    Diagnosis  POA   • **COVID-19 [U07.1]  Yes   • Cytokine release syndrome, grade 1 [D89.831]  No   • HTN (hypertension) [I10]  Yes   • DMII (diabetes mellitus, type 2) [E11.9]  Yes      Resolved Hospital Problems   No resolved problems to display.          Hospital Course:  Lizbet Flores is a 69 y.o. female    with PMHx significant for DMII,HTN who prsented with weakness, SOA and cough.     COVID-19  Left lower lobe infiltrate on chest x-ray  - She is not requiring any supplemental oxygen, saturations good on room air; therefore we will defer starting dexamethasone/remdesivir or Paxlovid   - Cont Azith/ Rocephin for total of 5 days   - D-dimer WNL  --PT/OT evaluated and have recommended IPR; family has now decided to take patient home and have outpatient PT.      Hypertension  - Increased home lisinopril to 20 mg; recommend daily monitoring at home and taking record to PCP in 1 week     Pyuria/ bacteruria   --Rocephin for above  --asymptomatic   --urine culture no growth     Type 2 diabetes  - Metformin restarted at DC        Discharge Follow Up Recommendations for outpatient labs/diagnostics:   PCP 1 week     Day of Discharge     HPI:   Doing well. Denies pain. No soa, headache, visual changes, chest pain or pressure. No lightheadedness or dizziness. Tolerating diet. Family in room updated on blood pressure medication changes. No f/c, n/v/d, soa or cp.        Review of Systems  All other systems negative     Vital Signs:   Temp:  [97.7 °F (36.5 °C)-98.9 °F (37.2 °C)] 97.7 °F (36.5 °C)  Heart Rate:  [] 74  Resp:  [16-18] 18  BP:  (154-225)/() 168/94      Physical Exam:  Constitutional: No acute distress, awake, alert  HENT: NCAT, mucous membranes moist  Respiratory: Clear to auscultation bilaterally, respiratory effort normal   Cardiovascular: RRR, no murmurs, rubs, or gallops  Gastrointestinal: Positive bowel sounds, soft, nontender, nondistended  Musculoskeletal: No bilateral ankle edema  Psychiatric: Appropriate affect, cooperative  Neurologic: Oriented x 3, strength symmetric in all extremities, Cranial Nerves grossly intact to confrontation, speech clear  Skin: No rashes     Pertinent  and/or Most Recent Results     LAB RESULTS:      Lab 02/21/24  0200 02/19/24  0424 02/18/24  2222   WBC  --  4.66 5.83   HEMOGLOBIN  --  13.2 13.2   HEMATOCRIT  --  39.8 41.9   PLATELETS  --  237 258   NEUTROS ABS  --  2.35 4.12   IMMATURE GRANS (ABS)  --  0.01 0.01   LYMPHS ABS  --  1.32 0.72   MONOS ABS  --  0.96* 0.95*   EOS ABS  --  0.00 0.01   MCV  --  91.5 91.3   CRP 1.72* 1.83*  --    PROCALCITONIN  --   --  0.05   LACTATE  --   --  1.4    162  --    D DIMER QUANT  --  0.55  --          Lab 02/21/24  0200 02/19/24 0424 02/18/24 2222   SODIUM 141 136 137   POTASSIUM 3.9 3.8 3.9   CHLORIDE 105 104 104   CO2 21.0* 22.0 23.0   ANION GAP 15.0 10.0 10.0   BUN 10 10 12   CREATININE 0.63 0.83 0.76   EGFR 95.6 75.9 84.4   GLUCOSE 103* 112* 137*   CALCIUM 9.2 8.8 8.9   MAGNESIUM  --  2.0 1.8         Lab 02/21/24  0200 02/19/24  0424 02/18/24  2222   TOTAL PROTEIN 7.2 6.9 7.2   ALBUMIN 3.6 3.6 3.6   GLOBULIN 3.6 3.3 3.6   ALT (SGPT) 9 8 10   AST (SGOT) 14 13 18   BILIRUBIN 0.2 0.2 0.2   ALK PHOS 109 111 116         Lab 02/18/24  2222   HSTROP T 9             Lab 02/21/24  0200   FERRITIN 92.03         Brief Urine Lab Results  (Last result in the past 365 days)        Color   Clarity   Blood   Leuk Est   Nitrite   Protein   CREAT   Urine HCG        02/18/24 2358 Yellow   Cloudy   Trace   Trace   Negative   Negative                 Microbiology  Results (last 10 days)       Procedure Component Value - Date/Time    Urine Culture - Urine, Urine, Clean Catch [[id number]]  (Normal) Collected: 02/18/24 2358    Lab Status: Preliminary result Specimen: Urine, Clean Catch Updated: 02/21/24 0941     Urine Culture No growth    COVID PRE-OP / PRE-PROCEDURE SCREENING ORDER (NO ISOLATION) - Swab, Nasopharynx [[id number]]  (Abnormal) Collected: 02/18/24 2252    Lab Status: Final result Specimen: Swab from Nasopharynx Updated: 02/18/24 2334    Narrative:      The following orders were created for panel order COVID PRE-OP / PRE-PROCEDURE SCREENING ORDER (NO ISOLATION) - Swab, Nasopharynx.  Procedure                               Abnormality         Status                     ---------                               -----------         ------                     COVID-19 and FLU A/B PCR...[[id number]]  Abnormal            Final result                 Please view results for these tests on the individual orders.    COVID-19 and FLU A/B PCR, 1 HR TAT - Swab, Nasopharynx [[id number]]  (Abnormal) Collected: 02/18/24 2252    Lab Status: Final result Specimen: Swab from Nasopharynx Updated: 02/18/24 2334     COVID19 Detected     Influenza A PCR Not Detected     Influenza B PCR Not Detected    Narrative:      Fact sheet for providers: https://www.fda.gov/media/473323/download    Fact sheet for patients: https://www.fda.gov/media/273124/download    Test performed by PCR.  Influenza A and Influenza B negative results should be considered presumptive in samples that have a positive SARS-CoV-2 result.    Competitive inhibition studies showed that SARS-CoV-2 virus, when present at concentrations above 3.6E+04 copies/mL, can inhibit the detection and amplification of influenza A and influenza B virus RNA if present at or below 1.8E+02 copies/mL or 4.9E+02 copies/mL, respectively, and may lead to false negative influenza virus results. If co-infection with influenza A or influenza B virus is  suspected in samples with a positive SARS-CoV-2 result, the sample should be re-tested with another FDA cleared, approved, or authorized influenza test, if influenza virus detection would change clinical management.    Blood Culture - Blood, Arm, Right [[id number]]  (Normal) Collected: 02/18/24 2249    Lab Status: Preliminary result Specimen: Blood from Arm, Right Updated: 02/20/24 2331     Blood Culture No growth at 2 days    Blood Culture - Blood, Arm, Left [[id number]]  (Normal) Collected: 02/18/24 2249    Lab Status: Preliminary result Specimen: Blood from Arm, Left Updated: 02/20/24 2331     Blood Culture No growth at 2 days            XR Chest 1 View    Result Date: 2/18/2024  XR CHEST 1 VW Date of Exam: 2/18/2024 10:28 PM EST Indication: Weak/Dizzy/AMS triage protocol Comparison: None available. Findings: Mild patchy airspace disease seen within the left lower lobe. May be a small left-sided pleural effusion. No pneumothorax. The pulmonary vasculature appears within normal limits. The cardiac and mediastinal silhouette appear unremarkable. No acute osseous abnormality identified.     Impression: Mild left lower lobe pneumonia with possible small left-sided pleural effusion. Electronically Signed: Celsa Hall MD  2/18/2024 10:53 PM EST  Workstation ID: [id number]                 Plan for Follow-up of Pending Labs/Results: PCP   Pending Labs       Order Current Status    Blood Culture - Blood, Arm, Left Preliminary result    Blood Culture - Blood, Arm, Right Preliminary result    Urine Culture - Urine, Urine, Clean Catch Preliminary result          Discharge Details        Discharge Medications        New Medications        Instructions Start Date   azithromycin 250 MG tablet  Commonly known as: Zithromax   Take one tablet daily for the next 2 days   Start Date: February 22, 2024     cefuroxime 500 MG tablet  Commonly known as: CEFTIN   500 mg, Oral, 2 Times Daily             Changes to Medications         Instructions Start Date   lisinopril 10 MG tablet  Commonly known as: PRINIVIL,ZESTRIL  What changed:   medication strength  how much to take   20 mg, Oral, Daily   Start Date: February 22, 2024            Continue These Medications        Instructions Start Date   metFORMIN 500 MG tablet  Commonly known as: GLUCOPHAGE   500 mg, Oral, 2 Times Daily With Meals               No Known Allergies      Discharge Disposition:  Home or Self Care    Diet:  Hospital:  Diet Order   Procedures   • Diet: Cardiac Diets, Diabetic Diets; Healthy Heart (2-3 Na+); Consistent Carbohydrate; Texture: Regular Texture (IDDSI 7); Fluid Consistency: Thin (IDDSI 0)              CODE STATUS:    Code Status and Medical Interventions:   Ordered at: 02/19/24 0230     Level Of Support Discussed With:    Patient     Code Status (Patient has no pulse and is not breathing):    CPR (Attempt to Resuscitate)     Medical Interventions (Patient has pulse or is breathing):    Full Support       No future appointments.              Annita Payton, APRN  02/21/24      Time Spent on Discharge:  I spent  45  minutes on this discharge activity which included: face-to-face encounter with the patient, reviewing the data in the system, coordination of the care with the nursing staff as well as consultants, documentation, and entering orders.

## 2024-02-21 NOTE — DISCHARGE INSTRUCTIONS
Please monitor blood pressures daily and record. Take log to PCP in 1 week to discuss blood pressure needs. If patient were to have any chest pain or pressure, severe headaches or dizziness please call PCP or go to ER.

## 2024-02-23 VITALS
OXYGEN SATURATION: 94 % | SYSTOLIC BLOOD PRESSURE: 145 MMHG | WEIGHT: 180 LBS | TEMPERATURE: 97.8 F | HEIGHT: 60 IN | HEART RATE: 82 BPM | BODY MASS INDEX: 35.34 KG/M2 | RESPIRATION RATE: 18 BRPM | DIASTOLIC BLOOD PRESSURE: 93 MMHG

## 2024-02-23 LAB
BACTERIA SPEC AEROBE CULT: NORMAL
BACTERIA SPEC AEROBE CULT: NORMAL

## 2024-02-24 LAB
QT INTERVAL: 312 MS
QTC INTERVAL: 406 MS

## 2024-09-13 ENCOUNTER — APPOINTMENT (OUTPATIENT)
Dept: CT IMAGING | Facility: HOSPITAL | Age: 69
End: 2024-09-13
Payer: MEDICARE

## 2024-09-13 ENCOUNTER — APPOINTMENT (OUTPATIENT)
Dept: MRI IMAGING | Facility: HOSPITAL | Age: 69
End: 2024-09-13
Payer: MEDICARE

## 2024-09-13 ENCOUNTER — APPOINTMENT (OUTPATIENT)
Dept: GENERAL RADIOLOGY | Facility: HOSPITAL | Age: 69
End: 2024-09-13
Payer: MEDICARE

## 2024-09-13 ENCOUNTER — HOSPITAL ENCOUNTER (OUTPATIENT)
Facility: HOSPITAL | Age: 69
Setting detail: OBSERVATION
Discharge: HOME | End: 2024-09-14
Attending: STUDENT IN AN ORGANIZED HEALTH CARE EDUCATION/TRAINING PROGRAM | Admitting: INTERNAL MEDICINE
Payer: MEDICARE

## 2024-09-13 DIAGNOSIS — R29.818 TRANSIENT NEUROLOGIC DEFICIT: ICD-10-CM

## 2024-09-13 DIAGNOSIS — R25.1 TREMOR: ICD-10-CM

## 2024-09-13 DIAGNOSIS — H53.8 BLURRED VISION, BILATERAL: ICD-10-CM

## 2024-09-13 DIAGNOSIS — H81.4 PERSISTENT VERTIGO OF CENTRAL ORIGIN: Primary | ICD-10-CM

## 2024-09-13 PROBLEM — H53.9 VISION CHANGES: Status: ACTIVE | Noted: 2024-09-13

## 2024-09-13 LAB
ALBUMIN SERPL-MCNC: 3.8 G/DL (ref 3.5–5.2)
ALBUMIN/GLOB SERPL: 1.2 G/DL
ALP SERPL-CCNC: 101 U/L (ref 39–117)
ALT SERPL W P-5'-P-CCNC: 7 U/L (ref 1–33)
ANION GAP SERPL CALCULATED.3IONS-SCNC: 11 MMOL/L (ref 5–15)
APTT PPP: 29.9 SECONDS (ref 22–39)
AST SERPL-CCNC: 18 U/L (ref 1–32)
BACTERIA UR QL AUTO: ABNORMAL /HPF
BASOPHILS # BLD AUTO: 0.03 10*3/MM3 (ref 0–0.2)
BASOPHILS NFR BLD AUTO: 0.4 % (ref 0–1.5)
BILIRUB SERPL-MCNC: 0.2 MG/DL (ref 0–1.2)
BILIRUB UR QL STRIP: NEGATIVE
BUN BLDA-MCNC: 23 MG/DL
BUN BLDA-MCNC: 23 MG/DL (ref 8–26)
BUN SERPL-MCNC: 21 MG/DL (ref 8–23)
BUN/CREAT SERPL: 23.6 (ref 7–25)
CA-I BLDA-SCNC: 1.19 MMOL/L (ref 1.2–1.32)
CALCIUM BLD QL: 1.19 MG/DL
CALCIUM SPEC-SCNC: 8.8 MG/DL (ref 8.6–10.5)
CHLORIDE BLDA-SCNC: 98 MMOL/L (ref 98–109)
CHLORIDE BLDA-SCNC: 98 MMOL/L (ref 98–109)
CHLORIDE SERPL-SCNC: 100 MMOL/L (ref 98–107)
CLARITY UR: ABNORMAL
CO2 BLDA-SCNC: 26 MMOL/L (ref 24–29)
CO2 SERPL-SCNC: 25 MMOL/L (ref 22–29)
COLOR UR: YELLOW
CREAT BLDA-MCNC: 1 MG/DL (ref 0.6–1.3)
CREAT BLDA-MCNC: 1 MG/DL (ref 0.6–1.3)
CREAT SERPL-MCNC: 0.89 MG/DL (ref 0.57–1)
DEPRECATED RDW RBC AUTO: 43.8 FL (ref 37–54)
EGFRCR SERPLBLD CKD-EPI 2021: 60.7 ML/MIN/1.73
EGFRCR SERPLBLD CKD-EPI 2021: 69.8 ML/MIN/1.73
EOSINOPHIL # BLD AUTO: 0.07 10*3/MM3 (ref 0–0.4)
EOSINOPHIL NFR BLD AUTO: 1 % (ref 0.3–6.2)
ERYTHROCYTE [DISTWIDTH] IN BLOOD BY AUTOMATED COUNT: 12.7 % (ref 12.3–15.4)
GLOBULIN UR ELPH-MCNC: 3.3 GM/DL
GLUCOSE BLDC GLUCOMTR-MCNC: 109 MG/DL (ref 70–130)
GLUCOSE BLDC GLUCOMTR-MCNC: 109 MG/DL (ref 70–130)
GLUCOSE BLDC GLUCOMTR-MCNC: 154 MG/DL (ref 70–130)
GLUCOSE BLDC GLUCOMTR-MCNC: 91 MG/DL (ref 70–130)
GLUCOSE SERPL-MCNC: 109 MG/DL (ref 65–99)
GLUCOSE UR STRIP-MCNC: NEGATIVE MG/DL
HCT VFR BLD AUTO: 32.9 % (ref 34–46.6)
HCT VFR BLDA CALC: 32 % (ref 38–51)
HCT VFR BLDA CALC: 32 % (ref 38–51)
HGB BLD-MCNC: 11.3 G/DL (ref 12–15.9)
HGB BLDA-MCNC: 10.9 G/DL (ref 12–17)
HGB BLDA-MCNC: 10.9 G/DL (ref 12–17)
HGB UR QL STRIP.AUTO: NEGATIVE
HOLD SPECIMEN: NORMAL
HYALINE CASTS UR QL AUTO: ABNORMAL /LPF
IMM GRANULOCYTES # BLD AUTO: 0.03 10*3/MM3 (ref 0–0.05)
IMM GRANULOCYTES NFR BLD AUTO: 0.4 % (ref 0–0.5)
KETONES UR QL STRIP: NEGATIVE
LEUKOCYTE ESTERASE UR QL STRIP.AUTO: ABNORMAL
LYMPHOCYTES # BLD AUTO: 2.55 10*3/MM3 (ref 0.7–3.1)
LYMPHOCYTES NFR BLD AUTO: 34.8 % (ref 19.6–45.3)
MAGNESIUM SERPL-MCNC: 1.9 MG/DL (ref 1.6–2.4)
MCH RBC QN AUTO: 32.2 PG (ref 26.6–33)
MCHC RBC AUTO-ENTMCNC: 34.3 G/DL (ref 31.5–35.7)
MCV RBC AUTO: 93.7 FL (ref 79–97)
MONOCYTES # BLD AUTO: 0.67 10*3/MM3 (ref 0.1–0.9)
MONOCYTES NFR BLD AUTO: 9.1 % (ref 5–12)
NEUTROPHILS NFR BLD AUTO: 3.98 10*3/MM3 (ref 1.7–7)
NEUTROPHILS NFR BLD AUTO: 54.3 % (ref 42.7–76)
NITRITE UR QL STRIP: NEGATIVE
NRBC BLD AUTO-RTO: 0 /100 WBC (ref 0–0.2)
PH UR STRIP.AUTO: 6 [PH] (ref 5–8)
PLATELET # BLD AUTO: 290 10*3/MM3 (ref 140–450)
PMV BLD AUTO: 10.1 FL (ref 6–12)
POTASSIUM BLDA-SCNC: 4.1 MMOL/L (ref 3.5–4.9)
POTASSIUM BLDA-SCNC: 4.1 MMOL/L (ref 3.5–4.9)
POTASSIUM SERPL-SCNC: 4.2 MMOL/L (ref 3.5–5.2)
PROT SERPL-MCNC: 7.1 G/DL (ref 6–8.5)
PROT UR QL STRIP: NEGATIVE
RBC # BLD AUTO: 3.51 10*6/MM3 (ref 3.77–5.28)
RBC # UR STRIP: ABNORMAL /HPF
REF LAB TEST METHOD: ABNORMAL
SODIUM BLD-SCNC: 135 MMOL/L (ref 138–146)
SODIUM BLD-SCNC: 135 MMOL/L (ref 138–146)
SODIUM SERPL-SCNC: 136 MMOL/L (ref 136–145)
SP GR UR STRIP: 1.01 (ref 1–1.03)
SQUAMOUS #/AREA URNS HPF: ABNORMAL /HPF
TROPONIN T SERPL HS-MCNC: 10 NG/L
TSH SERPL DL<=0.05 MIU/L-ACNC: 0.5 UIU/ML (ref 0.27–4.2)
UROBILINOGEN UR QL STRIP: ABNORMAL
WBC # UR STRIP: ABNORMAL /HPF
WBC NRBC COR # BLD AUTO: 7.33 10*3/MM3 (ref 3.4–10.8)
WHOLE BLOOD HOLD COAG: NORMAL
WHOLE BLOOD HOLD SPECIMEN: NORMAL

## 2024-09-13 PROCEDURE — G0378 HOSPITAL OBSERVATION PER HR: HCPCS

## 2024-09-13 PROCEDURE — 83735 ASSAY OF MAGNESIUM: CPT | Performed by: STUDENT IN AN ORGANIZED HEALTH CARE EDUCATION/TRAINING PROGRAM

## 2024-09-13 PROCEDURE — 25810000003 LACTATED RINGERS SOLUTION: Performed by: STUDENT IN AN ORGANIZED HEALTH CARE EDUCATION/TRAINING PROGRAM

## 2024-09-13 PROCEDURE — 81001 URINALYSIS AUTO W/SCOPE: CPT | Performed by: STUDENT IN AN ORGANIZED HEALTH CARE EDUCATION/TRAINING PROGRAM

## 2024-09-13 PROCEDURE — 0042T HC CT CEREBRAL PERFUSION W/WO CONTRAST: CPT

## 2024-09-13 PROCEDURE — 25510000001 IOPAMIDOL PER 1 ML: Performed by: STUDENT IN AN ORGANIZED HEALTH CARE EDUCATION/TRAINING PROGRAM

## 2024-09-13 PROCEDURE — 99285 EMERGENCY DEPT VISIT HI MDM: CPT

## 2024-09-13 PROCEDURE — 99214 OFFICE O/P EST MOD 30 MIN: CPT | Performed by: NURSE PRACTITIONER

## 2024-09-13 PROCEDURE — 93005 ELECTROCARDIOGRAM TRACING: CPT | Performed by: STUDENT IN AN ORGANIZED HEALTH CARE EDUCATION/TRAINING PROGRAM

## 2024-09-13 PROCEDURE — 70450 CT HEAD/BRAIN W/O DYE: CPT

## 2024-09-13 PROCEDURE — 99223 1ST HOSP IP/OBS HIGH 75: CPT | Performed by: FAMILY MEDICINE

## 2024-09-13 PROCEDURE — 96360 HYDRATION IV INFUSION INIT: CPT

## 2024-09-13 PROCEDURE — 85730 THROMBOPLASTIN TIME PARTIAL: CPT | Performed by: STUDENT IN AN ORGANIZED HEALTH CARE EDUCATION/TRAINING PROGRAM

## 2024-09-13 PROCEDURE — 71045 X-RAY EXAM CHEST 1 VIEW: CPT

## 2024-09-13 PROCEDURE — 82948 REAGENT STRIP/BLOOD GLUCOSE: CPT

## 2024-09-13 PROCEDURE — 99291 CRITICAL CARE FIRST HOUR: CPT

## 2024-09-13 PROCEDURE — 85014 HEMATOCRIT: CPT

## 2024-09-13 PROCEDURE — 70498 CT ANGIOGRAPHY NECK: CPT

## 2024-09-13 PROCEDURE — 70551 MRI BRAIN STEM W/O DYE: CPT

## 2024-09-13 PROCEDURE — 80053 COMPREHEN METABOLIC PANEL: CPT | Performed by: STUDENT IN AN ORGANIZED HEALTH CARE EDUCATION/TRAINING PROGRAM

## 2024-09-13 PROCEDURE — 84443 ASSAY THYROID STIM HORMONE: CPT | Performed by: STUDENT IN AN ORGANIZED HEALTH CARE EDUCATION/TRAINING PROGRAM

## 2024-09-13 PROCEDURE — 80047 BASIC METABLC PNL IONIZED CA: CPT

## 2024-09-13 PROCEDURE — 70496 CT ANGIOGRAPHY HEAD: CPT

## 2024-09-13 PROCEDURE — 84484 ASSAY OF TROPONIN QUANT: CPT | Performed by: STUDENT IN AN ORGANIZED HEALTH CARE EDUCATION/TRAINING PROGRAM

## 2024-09-13 PROCEDURE — 93005 ELECTROCARDIOGRAM TRACING: CPT

## 2024-09-13 PROCEDURE — 85025 COMPLETE CBC W/AUTO DIFF WBC: CPT | Performed by: STUDENT IN AN ORGANIZED HEALTH CARE EDUCATION/TRAINING PROGRAM

## 2024-09-13 RX ORDER — LISINOPRIL AND HYDROCHLOROTHIAZIDE 20; 25 MG/1; MG/1
1 TABLET ORAL DAILY
COMMUNITY
End: 2024-09-15 | Stop reason: HOSPADM

## 2024-09-13 RX ORDER — AMOXICILLIN 250 MG
2 CAPSULE ORAL 2 TIMES DAILY PRN
Status: DISCONTINUED | OUTPATIENT
Start: 2024-09-13 | End: 2024-09-14 | Stop reason: HOSPADM

## 2024-09-13 RX ORDER — DEXTROSE MONOHYDRATE 25 G/50ML
25 INJECTION, SOLUTION INTRAVENOUS
Status: DISCONTINUED | OUTPATIENT
Start: 2024-09-13 | End: 2024-09-14 | Stop reason: HOSPADM

## 2024-09-13 RX ORDER — MECLIZINE HYDROCHLORIDE 25 MG/1
25 TABLET ORAL ONCE
Status: COMPLETED | OUTPATIENT
Start: 2024-09-13 | End: 2024-09-13

## 2024-09-13 RX ORDER — NICOTINE POLACRILEX 4 MG
15 LOZENGE BUCCAL
Status: DISCONTINUED | OUTPATIENT
Start: 2024-09-13 | End: 2024-09-14 | Stop reason: HOSPADM

## 2024-09-13 RX ORDER — SODIUM CHLORIDE 9 MG/ML
40 INJECTION, SOLUTION INTRAVENOUS AS NEEDED
Status: DISCONTINUED | OUTPATIENT
Start: 2024-09-13 | End: 2024-09-13

## 2024-09-13 RX ORDER — ACETAMINOPHEN 160 MG/5ML
650 SOLUTION ORAL EVERY 4 HOURS PRN
Status: DISCONTINUED | OUTPATIENT
Start: 2024-09-13 | End: 2024-09-14 | Stop reason: HOSPADM

## 2024-09-13 RX ORDER — SODIUM CHLORIDE 0.9 % (FLUSH) 0.9 %
10 SYRINGE (ML) INJECTION AS NEEDED
Status: DISCONTINUED | OUTPATIENT
Start: 2024-09-13 | End: 2024-09-14 | Stop reason: HOSPADM

## 2024-09-13 RX ORDER — ONDANSETRON 2 MG/ML
4 INJECTION INTRAMUSCULAR; INTRAVENOUS EVERY 6 HOURS PRN
Status: DISCONTINUED | OUTPATIENT
Start: 2024-09-13 | End: 2024-09-14 | Stop reason: HOSPADM

## 2024-09-13 RX ORDER — BISACODYL 5 MG/1
5 TABLET, DELAYED RELEASE ORAL DAILY PRN
Status: DISCONTINUED | OUTPATIENT
Start: 2024-09-13 | End: 2024-09-14 | Stop reason: HOSPADM

## 2024-09-13 RX ORDER — NITROGLYCERIN 0.4 MG/1
0.4 TABLET SUBLINGUAL
Status: DISCONTINUED | OUTPATIENT
Start: 2024-09-13 | End: 2024-09-14 | Stop reason: HOSPADM

## 2024-09-13 RX ORDER — POLYETHYLENE GLYCOL 3350 17 G/17G
17 POWDER, FOR SOLUTION ORAL DAILY PRN
Status: DISCONTINUED | OUTPATIENT
Start: 2024-09-13 | End: 2024-09-14 | Stop reason: HOSPADM

## 2024-09-13 RX ORDER — SODIUM CHLORIDE 0.9 % (FLUSH) 0.9 %
10 SYRINGE (ML) INJECTION EVERY 12 HOURS SCHEDULED
Status: DISCONTINUED | OUTPATIENT
Start: 2024-09-13 | End: 2024-09-13

## 2024-09-13 RX ORDER — ONDANSETRON 4 MG/1
4 TABLET, ORALLY DISINTEGRATING ORAL EVERY 6 HOURS PRN
Status: DISCONTINUED | OUTPATIENT
Start: 2024-09-13 | End: 2024-09-14 | Stop reason: HOSPADM

## 2024-09-13 RX ORDER — CARBIDOPA AND LEVODOPA 25; 100 MG/1; MG/1
1 TABLET ORAL 3 TIMES DAILY
COMMUNITY

## 2024-09-13 RX ORDER — CARBIDOPA AND LEVODOPA 25; 100 MG/1; MG/1
1 TABLET ORAL 3 TIMES DAILY
Status: DISCONTINUED | OUTPATIENT
Start: 2024-09-13 | End: 2024-09-14 | Stop reason: HOSPADM

## 2024-09-13 RX ORDER — SODIUM CHLORIDE 0.9 % (FLUSH) 0.9 %
10 SYRINGE (ML) INJECTION AS NEEDED
Status: DISCONTINUED | OUTPATIENT
Start: 2024-09-13 | End: 2024-09-13

## 2024-09-13 RX ORDER — IOPAMIDOL 755 MG/ML
150 INJECTION, SOLUTION INTRAVASCULAR
Status: COMPLETED | OUTPATIENT
Start: 2024-09-13 | End: 2024-09-13

## 2024-09-13 RX ORDER — ASPIRIN 81 MG/1
81 TABLET, CHEWABLE ORAL DAILY
Status: DISCONTINUED | OUTPATIENT
Start: 2024-09-13 | End: 2024-09-14 | Stop reason: HOSPADM

## 2024-09-13 RX ORDER — IBUPROFEN 600 MG/1
1 TABLET ORAL
Status: DISCONTINUED | OUTPATIENT
Start: 2024-09-13 | End: 2024-09-14 | Stop reason: HOSPADM

## 2024-09-13 RX ORDER — BISACODYL 10 MG
10 SUPPOSITORY, RECTAL RECTAL DAILY PRN
Status: DISCONTINUED | OUTPATIENT
Start: 2024-09-13 | End: 2024-09-14 | Stop reason: HOSPADM

## 2024-09-13 RX ORDER — INSULIN LISPRO 100 [IU]/ML
2-7 INJECTION, SOLUTION INTRAVENOUS; SUBCUTANEOUS
Status: DISCONTINUED | OUTPATIENT
Start: 2024-09-13 | End: 2024-09-14 | Stop reason: HOSPADM

## 2024-09-13 RX ORDER — ACETAMINOPHEN 325 MG/1
650 TABLET ORAL EVERY 4 HOURS PRN
Status: DISCONTINUED | OUTPATIENT
Start: 2024-09-13 | End: 2024-09-14 | Stop reason: HOSPADM

## 2024-09-13 RX ORDER — CARBIDOPA AND LEVODOPA 25; 100 MG/1; MG/1
1 TABLET, EXTENDED RELEASE ORAL 3 TIMES DAILY
COMMUNITY
End: 2024-09-13

## 2024-09-13 RX ORDER — LISINOPRIL 20 MG/1
20 TABLET ORAL
Status: DISCONTINUED | OUTPATIENT
Start: 2024-09-13 | End: 2024-09-14 | Stop reason: HOSPADM

## 2024-09-13 RX ORDER — ACETAMINOPHEN 650 MG/1
650 SUPPOSITORY RECTAL EVERY 4 HOURS PRN
Status: DISCONTINUED | OUTPATIENT
Start: 2024-09-13 | End: 2024-09-14 | Stop reason: HOSPADM

## 2024-09-13 RX ORDER — HYDROCHLOROTHIAZIDE 25 MG/1
25 TABLET ORAL
Status: DISCONTINUED | OUTPATIENT
Start: 2024-09-13 | End: 2024-09-14 | Stop reason: HOSPADM

## 2024-09-13 RX ADMIN — CARBIDOPA AND LEVODOPA 1 TABLET: 25; 100 TABLET ORAL at 21:03

## 2024-09-13 RX ADMIN — MECLIZINE HYDROCHLORIDE 25 MG: 25 TABLET ORAL at 14:13

## 2024-09-13 RX ADMIN — SODIUM CHLORIDE, POTASSIUM CHLORIDE, SODIUM LACTATE AND CALCIUM CHLORIDE 1000 ML: 600; 310; 30; 20 INJECTION, SOLUTION INTRAVENOUS at 14:14

## 2024-09-13 RX ADMIN — IOPAMIDOL 115 ML: 755 INJECTION, SOLUTION INTRAVENOUS at 13:07

## 2024-09-13 RX ADMIN — ASPIRIN 81 MG 81 MG: 81 TABLET ORAL at 16:48

## 2024-09-13 NOTE — ED NOTES
" Lizbet Mark    Nursing Report ED to Floor:  Mental status: A+Ox4  Ambulatory status: 1 Assist  Oxygen Therapy:  RA  Cardiac Rhythm: NSR  Admitted from: Home  Safety Concerns:  Falls  Social Issues: N/A  ED Room #:  17    ED Nurse Phone Extension - [phone] or may call [phone].      HPI:   Chief Complaint   Patient presents with    Dizziness       Past Medical History:  Past Medical History:   Diagnosis Date    Hypertension         Past Surgical History:  History reviewed. No pertinent surgical history.     Admitting Doctor:   Gayle Recinos DO    Consulting Provider(s):  Consults       Date and Time Order Name Status Description    9/13/2024 12:43 PM Inpatient Neurology Consult Stroke               Admitting Diagnosis:   The primary encounter diagnosis was Persistent vertigo of central origin. Diagnoses of Blurred vision, bilateral and Tremor were also pertinent to this visit.    Most Recent Vitals:   Vitals:    09/13/24 1223   BP: 141/67   BP Location: Left arm   Patient Position: Sitting   Pulse: 89   Resp: 18   Temp: 98.3 °F (36.8 °C)   TempSrc: Oral   SpO2: 99%   Weight: 81.6 kg (180 lb)   Height: 152.4 cm (60\")       Active LDAs/IV Access:   Lines, Drains & Airways       Active LDAs       Name Placement date Placement time Site Days    Peripheral IV 09/13/24 1253 Right Antecubital 09/13/24  1253  Antecubital  less than 1                    Labs (abnormal labs have a star):   Labs Reviewed   COMPREHENSIVE METABOLIC PANEL - Abnormal; Notable for the following components:       Result Value    Glucose 109 (*)     All other components within normal limits    Narrative:     GFR Normal >60  Chronic Kidney Disease <60  Kidney Failure <15     URINALYSIS W/ MICROSCOPIC IF INDICATED (NO CULTURE) - Abnormal; Notable for the following components:    Appearance, UA Cloudy (*)     Leuk Esterase, UA Small (1+) (*)     All other components within normal limits   CBC WITH AUTO DIFFERENTIAL - Abnormal; Notable for the " following components:    RBC 3.51 (*)     Hemoglobin 11.3 (*)     Hematocrit 32.9 (*)     All other components within normal limits   URINALYSIS, MICROSCOPIC ONLY - Abnormal; Notable for the following components:    RBC, UA 6-10 (*)     All other components within normal limits   POCT CHEM 8 - Abnormal; Notable for the following components:    Sodium 135 (*)     Hemoglobin 10.9 (*)     Hematocrit 32 (*)     All other components within normal limits   SINGLE HS TROPONIN T - Normal    Narrative:     High Sensitive Troponin T Reference Range:  <14.0 ng/L- Negative Female for AMI  <22.0 ng/L- Negative Male for AMI  >=14 - Abnormal Female indicating possible myocardial injury.  >=22 - Abnormal Male indicating possible myocardial injury.   Clinicians would have to utilize clinical acumen, EKG, Troponin, and serial changes to determine if it is an Acute Myocardial Infarction or myocardial injury due to an underlying chronic condition.        MAGNESIUM - Normal   TSH - Normal   APTT - Normal    Narrative:     PTT = The equivalent PTT values for the therapeutic range of heparin levels at 0.3 to 0.5 U/ml are 60 to 70 seconds.   RAINBOW DRAW    Narrative:     The following orders were created for panel order [place] Draw.  Procedure                               Abnormality         Status                     ---------                               -----------         ------                     Green Top (Gel)[[id number]]                                  Final result               Lavender Top[[id number]]                                     Final result               Gold Top - SST[[id number]]                                   Final result               [place] Top[[id number]]                                         Final result               Light Blue Top[[id number]]                                   Final result                 Please view results for these tests on the individual orders.   CBC AND DIFFERENTIAL    Narrative:     The  following orders were created for panel order CBC & Differential.  Procedure                               Abnormality         Status                     ---------                               -----------         ------                     CBC Auto Differential[[id number]]        Abnormal            Final result                 Please view results for these tests on the individual orders.   GREEN TOP   LAVENDER TOP   GOLD TOP - SST   GRAY TOP   LIGHT BLUE TOP       Meds Given in ED:   Medications   lactated ringers bolus 1,000 mL (1,000 mL Intravenous New Bag 9/13/24 1414)   sodium chloride 0.9 % flush 10 mL (has no administration in time range)   meclizine (ANTIVERT) tablet 25 mg (25 mg Oral Given 9/13/24 1413)   iopamidol (ISOVUE-370) 76 % injection 150 mL (115 mL Intravenous Given 9/13/24 1307)           Last NIH score:                                                          Dysphagia screening results:  Patient Factors Component (Dysphagia:Stroke or Rule-out)  Best Eye Response: 4-->(E4) spontaneous (09/13/24 1431)  Best Motor Response: 6-->(M6) obeys commands (09/13/24 1431)  Best Verbal Response: 5-->(V5) oriented (09/13/24 1431)  [place] Coma Scale Score: 15 (09/13/24 1431)  Is there Facial Asymmetry/Weakness?: No (09/13/24 1431)  Is there Tongue Asymmetry/Weakness?: No (09/13/24 1431)  Is there Palatal Asymmetry/Weakness?: No (09/13/24 1431)  Patient Assessment Result: Pass - Proceed to Water Test (09/13/24 1431)     [place] Coma Scale:  No data recorded     CIWA:        Restraint Type:            Isolation Status:  No active isolations

## 2024-09-13 NOTE — Clinical Note
Level of Care: Telemetry [5]   Diagnosis: Vision changes [434404]   Bed Request Comments: stroke r/o

## 2024-09-13 NOTE — ED PROVIDER NOTES
EMERGENCY DEPARTMENT ENCOUNTER    Pt Name: Lizbet Flores  MRN: [id number]  Pt :   1955  Room Number:  S320/1  Date of encounter:  2024  PCP: Sonny Guzman MD  ED Provider: Stanley Torres MD    Historian: Patient, daughter    HPI:  Chief Complaint: Dizzy, blurry vision        Context: Lizbet Flores is a 69-year-old woman who presents the emergency department for persistent dizziness that started at 10:40 AM she is having blurred vision as well she is not appreciated to be positional but it did get worse once when she bent over.  She had a similar episode yesterday lasted 30 minutes and she had complete resolution of her symptoms without any dizziness following this.  She feels dizzy at this time and feels like her vision is blurred as well she denies any focal weakness or numbness.  She has a tremor in her right hand which she reports is chronic.  She has been started on carbidopa levodopa for that but does not have a diagnosis of Parkinson's.  No other complaints at this time.      PAST MEDICAL HISTORY  Past Medical History:   Diagnosis Date    Diabetes mellitus     Hypertension     Tremor          PAST SURGICAL HISTORY  History reviewed. No pertinent surgical history.      FAMILY HISTORY  History reviewed. No pertinent family history.      SOCIAL HISTORY  Social History     Socioeconomic History    Marital status:    Tobacco Use    Smoking status: Never     Passive exposure: Never    Smokeless tobacco: Never   Vaping Use    Vaping status: Never Used   Substance and Sexual Activity    Alcohol use: Never    Drug use: Never    Sexual activity: Defer         ALLERGIES  Patient has no known allergies.        REVIEW OF SYSTEMS  Review of Systems       All systems reviewed and negative except for those discussed in HPI.       PHYSICAL EXAM    I have reviewed the triage vital signs and nursing notes.    ED Triage Vitals [24 1223]   Temp Heart Rate Resp BP SpO2   98.3 °F (36.8 °C)  89 18 141/67 99 %      Temp src Heart Rate Source Patient Position BP Location FiO2 (%)   Oral Monitor Sitting Left arm --       Physical Exam  GENERAL:   Appears uncomfortable  HENT: Nares patent.  EYES: No scleral icterus.  Pupils equal and reactive. Hints exam equivocal with bilateral horizontal nystagmus and deviation of skew with occlusion of the right eye but also corrective saccade with leftward head impulse testing.   CV: Regular rhythm, regular rate.  RESPIRATORY: Normal effort.  No audible wheezes, rales or rhonchi.  ABDOMEN: Soft, nontender  MUSCULOSKELETAL: No deformities.   NEURO: Alert, moves all extremities, follows commands.  Right hand tremor patient reports is baseline  SKIN: Warm, dry, no rash visualized.      LAB RESULTS  Recent Results (from the past 24 hour(s))   ECG 12 Lead ED Triage Standing Order; Weak / Dizzy / AMS    Collection Time: 09/13/24 12:30 PM   Result Value Ref Range    QT Interval 362 ms    QTC Interval 425 ms   Comprehensive Metabolic Panel    Collection Time: 09/13/24 12:52 PM    Specimen: Blood   Result Value Ref Range    Glucose 109 (H) 65 - 99 mg/dL    BUN 21 8 - 23 mg/dL    Creatinine 0.89 0.57 - 1.00 mg/dL    Sodium 136 136 - 145 mmol/L    Potassium 4.2 3.5 - 5.2 mmol/L    Chloride 100 98 - 107 mmol/L    CO2 25.0 22.0 - 29.0 mmol/L    Calcium 8.8 8.6 - 10.5 mg/dL    Total Protein 7.1 6.0 - 8.5 g/dL    Albumin 3.8 3.5 - 5.2 g/dL    ALT (SGPT) 7 1 - 33 U/L    AST (SGOT) 18 1 - 32 U/L    Alkaline Phosphatase 101 39 - 117 U/L    Total Bilirubin 0.2 0.0 - 1.2 mg/dL    Globulin 3.3 gm/dL    A/G Ratio 1.2 g/dL    BUN/Creatinine Ratio 23.6 7.0 - 25.0    Anion Gap 11.0 5.0 - 15.0 mmol/L    eGFR 69.8 >60.0 mL/min/1.73   Single High Sensitivity Troponin T    Collection Time: 09/13/24 12:52 PM    Specimen: Blood   Result Value Ref Range    HS Troponin T 10 <14 ng/L   Magnesium    Collection Time: 09/13/24 12:52 PM    Specimen: Blood   Result Value Ref Range    Magnesium 1.9 1.6 - 2.4  mg/dL   Green Top (Gel)    Collection Time: 09/13/24 12:52 PM   Result Value Ref Range    Extra Tube Hold for add-ons.    Lavender Top    Collection Time: 09/13/24 12:52 PM   Result Value Ref Range    Extra Tube hold for add-on    Gold Top - SST    Collection Time: 09/13/24 12:52 PM   Result Value Ref Range    Extra Tube Hold for add-ons.    Gray Top    Collection Time: 09/13/24 12:52 PM   Result Value Ref Range    Extra Tube Hold for add-ons.    Light Blue Top    Collection Time: 09/13/24 12:52 PM   Result Value Ref Range    Extra Tube Hold for add-ons.    CBC Auto Differential    Collection Time: 09/13/24 12:52 PM    Specimen: Blood   Result Value Ref Range    WBC 7.33 3.40 - 10.80 10*3/mm3    RBC 3.51 (L) 3.77 - 5.28 10*6/mm3    Hemoglobin 11.3 (L) 12.0 - 15.9 g/dL    Hematocrit 32.9 (L) 34.0 - 46.6 %    MCV 93.7 79.0 - 97.0 fL    MCH 32.2 26.6 - 33.0 pg    MCHC 34.3 31.5 - 35.7 g/dL    RDW 12.7 12.3 - 15.4 %    RDW-SD 43.8 37.0 - 54.0 fl    MPV 10.1 6.0 - 12.0 fL    Platelets 290 140 - 450 10*3/mm3    Neutrophil % 54.3 42.7 - 76.0 %    Lymphocyte % 34.8 19.6 - 45.3 %    Monocyte % 9.1 5.0 - 12.0 %    Eosinophil % 1.0 0.3 - 6.2 %    Basophil % 0.4 0.0 - 1.5 %    Immature Grans % 0.4 0.0 - 0.5 %    Neutrophils, Absolute 3.98 1.70 - 7.00 10*3/mm3    Lymphocytes, Absolute 2.55 0.70 - 3.10 10*3/mm3    Monocytes, Absolute 0.67 0.10 - 0.90 10*3/mm3    Eosinophils, Absolute 0.07 0.00 - 0.40 10*3/mm3    Basophils, Absolute 0.03 0.00 - 0.20 10*3/mm3    Immature Grans, Absolute 0.03 0.00 - 0.05 10*3/mm3    nRBC 0.0 0.0 - 0.2 /100 WBC   TSH    Collection Time: 09/13/24 12:52 PM    Specimen: Blood   Result Value Ref Range    TSH 0.502 0.270 - 4.200 uIU/mL   aPTT    Collection Time: 09/13/24 12:52 PM    Specimen: Blood   Result Value Ref Range    PTT 29.9 22.0 - 39.0 seconds   POC CHEM 8    Collection Time: 09/13/24 12:54 PM    Specimen: Venous Blood   Result Value Ref Range    Glucose 109 70 - 130 mg/dL    BUN 23 8 - 26  mg/dL    Creatinine 1.00 0.60 - 1.30 mg/dL    Sodium 135 (L) 138 - 146 mmol/L    POC Potassium 4.1 3.5 - 4.9 mmol/L    Chloride 98 98 - 109 mmol/L    Total CO2 26 24 - 29 mmol/L    Hemoglobin 10.9 (L) 12.0 - 17.0 g/dL    Hematocrit 32 (L) 38 - 51 %    Ionized Calcium 1.19 (L) 1.20 - 1.32 mmol/L    eGFR 60.7 >60.0 mL/min/1.73   POC Chem 8    Collection Time: 09/13/24 12:55 PM    Specimen: Blood   Result Value Ref Range    Sodium 135 (A) 138 - 146 mmol/L    POC Potassium 4.1 3.5 - 4.9 mmol/L    Chloride 98 98 - 109 mmol/L    Calcium, POC 1.19 mg/dL    Glucose 109 70 - 130 mg/dL    BUN 23 mg/dL    Creatinine 1.00 0.60 - 1.30 mg/dL    Hemoglobin 10.9 (A) 12.0 - 17.0 g/dL    Hematocrit 32 (A) 38 - 51 %   Urinalysis With Microscopic If Indicated (No Culture) - Urine, Clean Catch    Collection Time: 09/13/24  1:24 PM    Specimen: Urine, Clean Catch   Result Value Ref Range    Color, UA Yellow Yellow, Straw    Appearance, UA Cloudy (A) Clear    pH, UA 6.0 5.0 - 8.0    Specific Gravity, UA 1.015 1.001 - 1.030    Glucose, UA Negative Negative    Ketones, UA Negative Negative    Bilirubin, UA Negative Negative    Blood, UA Negative Negative    Protein, UA Negative Negative    Leuk Esterase, UA Small (1+) (A) Negative    Nitrite, UA Negative Negative    Urobilinogen, UA 0.2 E.U./dL 0.2 - 1.0 E.U./dL   Urinalysis, Microscopic Only - Urine, Clean Catch    Collection Time: 09/13/24  1:24 PM    Specimen: Urine, Clean Catch   Result Value Ref Range    RBC, UA 6-10 (A) None Seen, 0-2 /HPF    WBC, UA 0-2 None Seen, 0-2 /HPF    Bacteria, UA None Seen None Seen, Trace /HPF    Squamous Epithelial Cells, UA 0-2 None Seen, 0-2 /HPF    Hyaline Casts, UA None Seen 0 - 6 /LPF    Methodology Automated Microscopy    POC Glucose Once    Collection Time: 09/13/24  4:24 PM    Specimen: Blood   Result Value Ref Range    Glucose 91 70 - 130 mg/dL       If labs were ordered, I independently reviewed the results and considered them in treating the  patient.        RADIOLOGY  XR Chest 1 View    Result Date: 9/13/2024  XR CHEST 1 VW Date of Exam: 9/13/2024 2:03 PM EDT Indication: Weak/Dizzy/AMS triage protocol Comparison: 2/18/2024 Findings: Cardiomediastinal silhouette is unremarkable. There is atherosclerosis of the aorta. There is an opacity at the left lung base in the region of the costophrenic angle which is similar to the previous study. Lungs are otherwise clear. No definite pleural effusion or pneumothorax. There are degenerative changes of the shoulders.     Impression: Left basilar opacity in the region of the costophrenic angle which is similar to the previous study. This could represent a prominent epicardial fat pad shadow; however, infiltrate is not excluded. CT of the chest is recommended for further assessment. Electronically Signed: Lexy Bocanegra MD  9/13/2024 2:25 PM EDT  Workstation ID: [id number]    MRI Brain Without Contrast    Result Date: 9/13/2024  MRI BRAIN WO CONTRAST Date of Exam: 9/13/2024 1:30 PM EDT Indication: dizziness, blurred vision, right arm numbness.  Comparison: CTs same day Technique:  Routine multiplanar/multisequence sequence images of the brain were obtained without contrast administration. Findings: Diffusion weighted imaging demonstrates no acute restriction abnormality. Midline structures of the brain are grossly unremarkable in appearance. Pituitary and sella structures and craniocervical junction are grossly unremarkable in appearance. The ventricles, cisterns and sulci appear within normal limits. Hyperostosis frontalis noted bilaterally. No suspicious extra-axial fluid collection. No acute intracranial hemorrhage or mass effect. Minimal FLAIR and T2 signal abnormality within the supratentorial white matter predominantly within the right frontal lobe noted. Findings are nonspecific but are most compatible with sequela small vessel ischemic disease in this age group. No acute intracranial hemorrhage or mass effect.  Area of susceptibility artifact within the right side of the ap, midbrain suggest sequela of  microinfarct and associated hemosiderin distribution. The major intracranial flow voids appear patent. Thinning of the lenses noted in the orbits bilaterally suggesting prior cataract surgery. Visualized paranasal sinuses are grossly clear. Mastoid air cells are grossly clear     Impression: 1. No acute ischemic change 2. White matter changes compatible with small vessel ischemic disease in this age group. Electronically Signed: Bassam Anglin MD  9/13/2024 2:13 PM EDT  Workstation ID: [id number]    CT Angiogram Head w AI Analysis of LVO    Result Date: 9/13/2024  CT ANGIOGRAM HEAD W AI ANALYSIS OF LVO, CT CEREBRAL PERFUSION W WO CONTRAST, CT ANGIOGRAM NECK Date of Exam: 9/13/2024 12:54 PM EDT Indication: Neuro Deficit, acute, Stroke suspected Neuro deficit, acute stroke suspected. Comparison: None available. Technique: Axial CT images of the brain were obtained prior to and after the administration of 115 mL Isovue-370. CT Perfusion protocol was utilized. Automated post processing was performed by RAPID software and submitted to PACS for interpretation.  CTA  of the head and neck were performed after the administration of iodinated contrast.  Reconstructed coronal and sagittal images were also obtained. A 3-D volume rendered image was created for interpretation. Automated exposure control and iterative reconstruction methods were used.   Findings: CT perfusion: Color maps are symmetric, without evidence of core infarct or significant territorial ischemic tissue at risk. CT ANGIOGRAM: The lung apices are clear. Evaluation of the neck soft tissues demonstrates no pathologic cervical adenopathy or unexpected aerodigestive tract mass. The osseous structures demonstrate multilevel spondylosis, without evidence of acute fracture or aggressive osseous lesion. Patent aortic arch with three-vessel branching. The visualized  subclavian arteries are patent bilaterally. Calcific atherosclerotic changes are present at both ICA origins with mild, less than 50% stenosis present bilaterally. The right vertebral artery appears mildly diminutive but otherwise patent. Patent dominant left vertebral artery. Intracranially, the carotid siphons demonstrate minimal calcific atherosclerotic change without associated stenosis. The anterior cerebral arteries appear normal. The right and the left middle cerebral arteries demonstrate no evidence of flow-limiting stenosis, large vessel occlusion or aneurysm. The vertebral basilar system appears diminutive but patent, with tapering of the basilar artery and prominent filling of both posterior cerebral arteries via patent posterior communicating arteries. The posterior cerebral arteries are patent bilaterally.     Impression: CT perfusion demonstrates no evidence of core infarct or significant territorial ischemic tissue at risk. Mild cervical and intracranial atherosclerotic changes are present without evidence of high-grade flow-limiting stenosis, large vessel occlusion or aneurysm. Incidentally noted anterior dominant intracranial circulation with tapering of the basilar artery, essentially terminating with both superior cerebellar arteries and prominent filling of bilateral PCAs via patent bilateral posterior communicating arteries. Electronically Signed: Brain Asencio MD  9/13/2024 1:18 PM EDT  Workstation ID: [id number]    CT Angiogram Neck    Result Date: 9/13/2024  CT ANGIOGRAM HEAD W AI ANALYSIS OF LVO, CT CEREBRAL PERFUSION W WO CONTRAST, CT ANGIOGRAM NECK Date of Exam: 9/13/2024 12:54 PM EDT Indication: Neuro Deficit, acute, Stroke suspected Neuro deficit, acute stroke suspected. Comparison: None available. Technique: Axial CT images of the brain were obtained prior to and after the administration of 115 mL Isovue-370. CT Perfusion protocol was utilized. Automated post processing was performed by  RAPID software and submitted to PACS for interpretation.  CTA  of the head and neck were performed after the administration of iodinated contrast.  Reconstructed coronal and sagittal images were also obtained. A 3-D volume rendered image was created for interpretation. Automated exposure control and iterative reconstruction methods were used.   Findings: CT perfusion: Color maps are symmetric, without evidence of core infarct or significant territorial ischemic tissue at risk. CT ANGIOGRAM: The lung apices are clear. Evaluation of the neck soft tissues demonstrates no pathologic cervical adenopathy or unexpected aerodigestive tract mass. The osseous structures demonstrate multilevel spondylosis, without evidence of acute fracture or aggressive osseous lesion. Patent aortic arch with three-vessel branching. The visualized subclavian arteries are patent bilaterally. Calcific atherosclerotic changes are present at both ICA origins with mild, less than 50% stenosis present bilaterally. The right vertebral artery appears mildly diminutive but otherwise patent. Patent dominant left vertebral artery. Intracranially, the carotid siphons demonstrate minimal calcific atherosclerotic change without associated stenosis. The anterior cerebral arteries appear normal. The right and the left middle cerebral arteries demonstrate no evidence of flow-limiting stenosis, large vessel occlusion or aneurysm. The vertebral basilar system appears diminutive but patent, with tapering of the basilar artery and prominent filling of both posterior cerebral arteries via patent posterior communicating arteries. The posterior cerebral arteries are patent bilaterally.     Impression: CT perfusion demonstrates no evidence of core infarct or significant territorial ischemic tissue at risk. Mild cervical and intracranial atherosclerotic changes are present without evidence of high-grade flow-limiting stenosis, large vessel occlusion or aneurysm.  Incidentally noted anterior dominant intracranial circulation with tapering of the basilar artery, essentially terminating with both superior cerebellar arteries and prominent filling of bilateral PCAs via patent bilateral posterior communicating arteries. Electronically Signed: Brain Asencio MD  9/13/2024 1:18 PM EDT  Workstation ID: [id number]    CT CEREBRAL PERFUSION WITH & WITHOUT CONTRAST    Result Date: 9/13/2024  CT ANGIOGRAM HEAD W AI ANALYSIS OF LVO, CT CEREBRAL PERFUSION W WO CONTRAST, CT ANGIOGRAM NECK Date of Exam: 9/13/2024 12:54 PM EDT Indication: Neuro Deficit, acute, Stroke suspected Neuro deficit, acute stroke suspected. Comparison: None available. Technique: Axial CT images of the brain were obtained prior to and after the administration of 115 mL Isovue-370. CT Perfusion protocol was utilized. Automated post processing was performed by RAPID software and submitted to PACS for interpretation.  CTA  of the head and neck were performed after the administration of iodinated contrast.  Reconstructed coronal and sagittal images were also obtained. A 3-D volume rendered image was created for interpretation. Automated exposure control and iterative reconstruction methods were used.   Findings: CT perfusion: Color maps are symmetric, without evidence of core infarct or significant territorial ischemic tissue at risk. CT ANGIOGRAM: The lung apices are clear. Evaluation of the neck soft tissues demonstrates no pathologic cervical adenopathy or unexpected aerodigestive tract mass. The osseous structures demonstrate multilevel spondylosis, without evidence of acute fracture or aggressive osseous lesion. Patent aortic arch with three-vessel branching. The visualized subclavian arteries are patent bilaterally. Calcific atherosclerotic changes are present at both ICA origins with mild, less than 50% stenosis present bilaterally. The right vertebral artery appears mildly diminutive but otherwise patent. Patent  dominant left vertebral artery. Intracranially, the carotid siphons demonstrate minimal calcific atherosclerotic change without associated stenosis. The anterior cerebral arteries appear normal. The right and the left middle cerebral arteries demonstrate no evidence of flow-limiting stenosis, large vessel occlusion or aneurysm. The vertebral basilar system appears diminutive but patent, with tapering of the basilar artery and prominent filling of both posterior cerebral arteries via patent posterior communicating arteries. The posterior cerebral arteries are patent bilaterally.     Impression: CT perfusion demonstrates no evidence of core infarct or significant territorial ischemic tissue at risk. Mild cervical and intracranial atherosclerotic changes are present without evidence of high-grade flow-limiting stenosis, large vessel occlusion or aneurysm. Incidentally noted anterior dominant intracranial circulation with tapering of the basilar artery, essentially terminating with both superior cerebellar arteries and prominent filling of bilateral PCAs via patent bilateral posterior communicating arteries. Electronically Signed: Brain Asencio MD  9/13/2024 1:18 PM EDT  Workstation ID: [id number]    CT Head Without Contrast Stroke Protocol    Result Date: 9/13/2024  CT HEAD WO CONTRAST STROKE PROTOCOL Date of Exam: 9/13/2024 12:45 PM EDT Indication: Neuro deficit, acute, stroke suspected Neuro Deficit, acute, Stroke suspected. Comparison: None available. Technique: Axial CT images were obtained of the head without contrast administration.  Reconstructed coronal images were also obtained. Automated exposure control and iterative construction methods were used. Scan Time: 12:47 Results discussed with the stroke team in person at 12:50. Findings: No evidence of acute intracranial hemorrhage or mass effect. No extra-axial collection. The gray white matter differentiation is preserved. Ventricles and sulci are symmetric.  Mild periventricular and subcortical white matter hypodensity, nonspecific, but likely sequela of chronic small vessel ischemic disease. The mastoid air cells and paranasal sinuses are well aerated. Globes and extraocular muscles are unremarkable. No acute or suspicious osseous abnormality. Soft tissues within normal limits.     Impression: No evidence of acute intracranial abnormality. Typical chronic/age-related findings including white matter change suggestive of mild chronic small vessel ischemia. Electronically Signed: John Saldaña MD  9/13/2024 12:58 PM EDT  Workstation ID: [id number]     I ordered and independently reviewed the above noted radiographic studies.      I viewed images of Noncon head CT personally reviewed do not appreciate any acute abnormalities CTAs and CT perfusion's reviewed with neurostroke team no acute perfusion deficits but she does have atherosclerosis    See radiologist's dictation for official interpretation.        PROCEDURES    Procedures    ECG 12 Lead ED Triage Standing Order; Weak / Dizzy / AMS   Preliminary Result   Test Reason : ED Triage Standing Order~   Blood Pressure :   */*   mmHG   Vent. Rate :  83 BPM     Atrial Rate :  83 BPM      P-R Int : 164 ms          QRS Dur :  82 ms       QT Int : 362 ms       P-R-T Axes :  25  24  32 degrees      QTc Int : 425 ms      Normal sinus rhythm   Normal ECG   When compared with ECG of 18-FEB-2024 23:30,   No significant change was found      Referred By: ED MD           Confirmed By:           MEDICATIONS GIVEN IN ER    Medications   sodium chloride 0.9 % flush 10 mL (has no administration in time range)   nitroglycerin (NITROSTAT) SL tablet 0.4 mg (has no administration in time range)   acetaminophen (TYLENOL) tablet 650 mg (has no administration in time range)     Or   acetaminophen (TYLENOL) 160 MG/5ML oral solution 650 mg (has no administration in time range)     Or   acetaminophen (TYLENOL) suppository 650 mg (has no administration  in time range)   sennosides-docusate (PERICOLACE) 8.6-50 MG per tablet 2 tablet (has no administration in time range)     And   polyethylene glycol (MIRALAX) packet 17 g (has no administration in time range)     And   bisacodyl (DULCOLAX) EC tablet 5 mg (has no administration in time range)     And   bisacodyl (DULCOLAX) suppository 10 mg (has no administration in time range)   ondansetron ODT (ZOFRAN-ODT) disintegrating tablet 4 mg (has no administration in time range)     Or   ondansetron (ZOFRAN) injection 4 mg (has no administration in time range)   carbidopa-levodopa (SINEMET)  MG per tablet 1 tablet (has no administration in time range)   lisinopril (PRINIVIL,ZESTRIL) tablet 20 mg (20 mg Oral Not Given 9/13/24 1617)     And   hydroCHLOROthiazide tablet 25 mg (25 mg Oral Not Given 9/13/24 1618)   dextrose (GLUTOSE) oral gel 15 g (has no administration in time range)   dextrose (D50W) (25 g/50 mL) IV injection 25 g (has no administration in time range)   glucagon (GLUCAGEN) injection 1 mg (has no administration in time range)   Insulin Lispro (humaLOG) injection 2-7 Units ( Subcutaneous Not Given 9/13/24 1632)   aspirin chewable tablet 81 mg (has no administration in time range)   lactated ringers bolus 1,000 mL (1,000 mL Intravenous New Bag 9/13/24 1414)   meclizine (ANTIVERT) tablet 25 mg (25 mg Oral Given 9/13/24 1413)   iopamidol (ISOVUE-370) 76 % injection 150 mL (115 mL Intravenous Given 9/13/24 1307)         MEDICAL DECISION MAKING, PROGRESS, and CONSULTS    All labs, if obtained, have been independently reviewed by me.  All radiology studies, if obtained, have been reviewed by me and the radiologist dictating the report.  All EKG's, if obtained, have been independently viewed and interpreted by me/my attending physician.      Discussion below represents my analysis of pertinent findings related to patient's condition, differential diagnosis, treatment plan and final disposition.                          Differential diagnosis:    Posterior stroke, positional vertigo,, sepsis, anemia, electrolyte abnormality, hemorrhagic stroke, brain mass      Additional sources:    - Discussed/ obtained information from independent historians: Daughter    - External (non-ED) record review: Chart review for COVID-19 shows history of diabetes, hypertension    - Chronic or social conditions impacting care: Diabetes, hypertension    - Shared decision making: Patient/patient representative in complete agreement with current plans for evaluation and management.      Orders placed during this visit:  Orders Placed This Encounter   Procedures    XR Chest 1 View    CT Head Without Contrast Stroke Protocol    CT Angiogram Head w AI Analysis of LVO    CT Angiogram Neck    CT CEREBRAL PERFUSION WITH & WITHOUT CONTRAST    MRI Brain Without Contrast    [place] Draw    Comprehensive Metabolic Panel    Single High Sensitivity Troponin T    Magnesium    Urinalysis With Microscopic If Indicated (No Culture) - Urine, Clean Catch    CBC Auto Differential    TSH    aPTT    Urinalysis, Microscopic Only - Urine, Clean Catch    CBC (No Diff)    Basic Metabolic Panel    Hemoglobin A1c    Diet: Regular/House; Fluid Consistency: Thin (IDDSI 0)    Measure Actual Weight    Undress and Gown    Neuro Checks    Nursing Dysphagia Screening (Complete Prior to Giving anything PO)    RN to Place Order SLP Consult (IF swallow screen failed) - Eval & Treat Choosing Reason of RN Dysphagia Screen Failed    Vital Signs    Intake & Output    Oral Care    Place Sequential Compression Device    Maintain Sequential Compression Device    Telemetry - Place Orders & Notify Provider of Results When Patient Experiences Acute Chest Pain, Dysrhythmia or Respiratory Distress    May Be Off Telemetry for Tests    Continuous Pulse Oximetry    Up With Assistance    Orthostatic Blood Pressure    Code Status and Medical Interventions: No CPR (Do Not Attempt to Resuscitate); Limited  Support; No intubation (DNI)    Inpatient Neurology Consult Stroke    Inpatient Case Management  Consult    DIET MESSAGE New patient on floor from ER. Diet changed from CC Cardiac to Regular. Thank you    OT Consult: Eval & Treat Discharge Placement Assessment    PT Consult: Eval & Treat Discharge Placement Assessment    Oxygen Therapy- Nasal Cannula; Titrate 1-6 LPM Per SpO2; 90 - 95%    SLP Consult: Eval & Treat Communication Disorder    POC Chem 8    POC CHEM 8    POC Glucose 4x Daily Before Meals & at Bedtime    POC Glucose Once    ECG 12 Lead ED Triage Standing Order; Weak / Dizzy / AMS    Adult Transthoracic Echo Complete W/ Cont if Necessary Per Protocol    Insert Peripheral IV    Insert Large-Bore Peripheral IV - Right AC Preferred    Initiate Observation Status    ED Bed Request    Fall Precautions    CBC & Differential    Green Top (Gel)    Lavender Top    Gold Top - SST    Gray Top    Light Blue Top         Additional orders considered but not ordered:      ED Course:    Consultants: Stroke neurology, hospital medicine    ED Course as of 09/13/24 1640   Fri Sep 13, 2024   Sd Is a very nice 69-year-old woman who presents the emergency department for persistent dizziness that started at 10:40 AM she is having blurred vision as well she is not appreciated to be positional but it did get worse once when she bent over.  She had a similar episode yesterday lasted 30 minutes and she had complete resolution of her symptoms without any dizziness following this.  She feels dizzy at this time and feels like her vision is blurred as well she denies any focal weakness or numbness.  She has a tremor in her right hand which she reports is chronic.  She has been started on carbidopa levodopa for that but does not have a diagnosis of Parkinson's.  No other complaints at this time. [CC]   1243 She arrived awake and alert came through triage I saw the patient in pit multiple concerning historical items for  posterior stroke including persistence of vertigo even at rest and the visual symptoms.  Hints exam equivocal with bilateral horizontal nystagmus and deviation of skew with occlusion of the right eye but also corrective saccade with leftward head impulse testing.  I decided to make the patient a stroke alert took her to the CT scanner have notified charge was contacted CT scanner and stroke neurology who is now arrived in the CT scanner evaluating the patient at this time.  Also obtaining basic laboratory workup treating her with IV fluids and meclizine. [CC]   1305 Noncon head CT not showing any acute abnormalities. [CC]   1344 CTAs and CT perfusion does not show any acute perfusion deficit but showing diffuse atherosclerosis.  Stroke is calling her an NIH of 1 I have offered tenecteplase but the patient and stroke team have decided to obtain a rapid MRI prior to initiating tenecteplase.  CBC and CMP generally reassuring and nonactionable.  Normal troponin. [CC]   1640 MRI not showing acute pathology but stroke team is concerned he may Ceanel pa lesion recommending hospital admission.  I have discussed with the patient she is agreeable to this plan.  Medicine team consulted for admission. [CC]      ED Course User Index  [CC] Stanley Torres MD       40 minutes of critical care provided. This time excludes other billable procedures. Time does include preparation of documents, medical consultations, review of old records, and direct bedside care. Patient is at high risk for life-threatening deterioration due to acute persistent vertigo and visual changes concerning for posterior stroke and within the tenecteplase window managed as a stroke alert.         Shared Decision Making:  After my consideration of clinical presentation and any laboratory/radiology studies obtained, I discussed the findings with the patient/patient representative who is in agreement with the treatment plan and the final disposition.    Risks and benefits of discharge and/or observation/admission were discussed.       AS OF 16:40 EDT VITALS:    BP - 109/65  HR - 90  TEMP - 98.3 °F (36.8 °C) (Oral)  O2 SATS - 95%                  DIAGNOSIS  Final diagnoses:   Persistent vertigo of central origin   Blurred vision, bilateral   Tremor         DISPOSITION  Admit      Please note that portions of this document were completed with voice recognition software.        Stanley Torres MD  09/13/24 4617

## 2024-09-13 NOTE — H&P
[facility] Medicine Services  HISTORY AND PHYSICAL    Patient Name: Lizbet Flores  : 1955  MRN: [id number]  Primary Care Physician: Sonny Guzman MD  Date of admission: 2024      Subjective   Subjective     Chief Complaint:  Dizziness, blurred vision    HPI:  Lizbet Flores is a 69 y.o. female with PMHx pre-diabetes on Metformin, HTN, tremor (on carbidopa-levodopa) who presents to ED today due to ongoing dizziness and blurred vision. Patient states she intermittently donates blood and she donated blood yesterday and afterwards when she was driving home, she felt dizzy and her vision felt blurred. She has never experienced this before with donating blood. She has been in her usual state of health. No new mediations.   She was evaluated by Stroke team in ED. CT head is negative for acute abnormality. CT perfusion negative. CTA head and neck is negative for flow-limiting stenosis, no LVO or aneurysm.  MRI brain is negative for acute stroke. Discussed with stroke navigator, plan to observe overnight, obtain Echo. [facility] medicine asked to admit.     Personal History     Past Medical History:   Diagnosis Date    Diabetes mellitus     Hypertension     Tremor            History reviewed. No pertinent surgical history.    Family History: family history is not on file.     Social History:  reports that she has never smoked. She has never been exposed to tobacco smoke. She has never used smokeless tobacco. She reports that she does not drink alcohol and does not use drugs.  Social History     Social History Narrative    Not on file       Medications:  Available home medication information reviewed.  B Complex Vitamins, carbidopa-levodopa, lisinopril-hydrochlorothiazide, and metFORMIN    No Known Allergies    Objective   Objective     Vital Signs:   Temp:  [98.3 °F (36.8 °C)] 98.3 °F (36.8 °C)  Heart Rate:  [88-99] 90  Resp:  [18] 18  BP: (130-141)/(45-73) 132/73       Physical  Exam   Constitutional: Awake, alert, NAD, non-toxic  Eyes: PERRLA, sclerae anicteric, no conjunctival injection  HENT: NCAT, mucous membranes moist  Neck: Supple, no thyromegaly, no lymphadenopathy, trachea midline  Respiratory: Clear to auscultation bilaterally, nonlabored respirations RA  Cardiovascular: RRR, no murmurs, rubs, or gallops, palpable pedal pulses bilaterally  Gastrointestinal: Positive bowel sounds, soft, nontender, nondistended  Musculoskeletal: No bilateral ankle edema, no clubbing or cyanosis to extremities  Psychiatric: Appropriate affect, cooperative  Neurologic: Oriented x 3, strength symmetric in all extremities, Cranial Nerves grossly intact to confrontation, speech clear. RUE/RLE tremor   Skin: No rashes     Result Review:  I have personally reviewed the results from the time of this admission to 9/13/2024 15:51 EDT and agree with these findings:  [x]  Laboratory list / accordion  []  Microbiology  [x]  Radiology  [x]  EKG/Telemetry   []  Cardiology/Vascular   []  Pathology  []  Old records  []  Other:      LAB RESULTS:      Lab 09/13/24  1255 09/13/24  1254 09/13/24  1252   WBC  --   --  7.33   HEMOGLOBIN  --   --  11.3*   HEMOGLOBIN, POC 10.9* 10.9*  --    HEMATOCRIT  --   --  32.9*   HEMATOCRIT POC 32* 32*  --    PLATELETS  --   --  290   NEUTROS ABS  --   --  3.98   IMMATURE GRANS (ABS)  --   --  0.03   LYMPHS ABS  --   --  2.55   MONOS ABS  --   --  0.67   EOS ABS  --   --  0.07   MCV  --   --  93.7   APTT  --   --  29.9         Lab 09/13/24  1255 09/13/24  1254 09/13/24  1252   SODIUM  --   --  136   POTASSIUM  --   --  4.2   CHLORIDE  --   --  100   CO2  --   --  25.0   ANION GAP  --   --  11.0   BUN  --   --  21   CREATININE 1.00 1.00 0.89   EGFR  --  60.7 69.8   GLUCOSE  --   --  109*   CALCIUM  --   --  8.8   MAGNESIUM  --   --  1.9   TSH  --   --  0.502         Lab 09/13/24  1252   TOTAL PROTEIN 7.1   ALBUMIN 3.8   GLOBULIN 3.3   ALT (SGPT) 7   AST (SGOT) 18   BILIRUBIN 0.2   ALK  PHOS 101         Lab 09/13/24  1252   HSTROP T 10                 UA          2/18/2024    23:58 9/13/2024    13:24   Urinalysis   Squamous Epithelial Cells, UA 3-6  0-2    Specific Gravity, UA 1.019  1.015    Ketones, UA Negative  Negative    Blood, UA Trace  Negative    Leukocytes, UA Trace  Small (1+)    Nitrite, UA Negative  Negative    RBC, UA 21-50  6-10    WBC, UA 3-5  0-2    Bacteria, UA 1+  None Seen        Microbiology Results (last 10 days)       ** No results found for the last 240 hours. **            XR Chest 1 View    Result Date: 9/13/2024  XR CHEST 1 VW Date of Exam: 9/13/2024 2:03 PM EDT Indication: Weak/Dizzy/AMS triage protocol Comparison: 2/18/2024 Findings: Cardiomediastinal silhouette is unremarkable. There is atherosclerosis of the aorta. There is an opacity at the left lung base in the region of the costophrenic angle which is similar to the previous study. Lungs are otherwise clear. No definite pleural effusion or pneumothorax. There are degenerative changes of the shoulders.     Impression: Impression: Left basilar opacity in the region of the costophrenic angle which is similar to the previous study. This could represent a prominent epicardial fat pad shadow; however, infiltrate is not excluded. CT of the chest is recommended for further assessment. Electronically Signed: Lexy Bocanegra MD  9/13/2024 2:25 PM EDT  Workstation ID: [id number]    MRI Brain Without Contrast    Result Date: 9/13/2024  MRI BRAIN WO CONTRAST Date of Exam: 9/13/2024 1:30 PM EDT Indication: dizziness, blurred vision, right arm numbness.  Comparison: CTs same day Technique:  Routine multiplanar/multisequence sequence images of the brain were obtained without contrast administration. Findings: Diffusion weighted imaging demonstrates no acute restriction abnormality. Midline structures of the brain are grossly unremarkable in appearance. Pituitary and sella structures and craniocervical junction are grossly unremarkable  in appearance. The ventricles, cisterns and sulci appear within normal limits. Hyperostosis frontalis noted bilaterally. No suspicious extra-axial fluid collection. No acute intracranial hemorrhage or mass effect. Minimal FLAIR and T2 signal abnormality within the supratentorial white matter predominantly within the right frontal lobe noted. Findings are nonspecific but are most compatible with sequela small vessel ischemic disease in this age group. No acute intracranial hemorrhage or mass effect. Area of susceptibility artifact within the right side of the pa, midbrain suggest sequela of  microinfarct and associated hemosiderin distribution. The major intracranial flow voids appear patent. Thinning of the lenses noted in the orbits bilaterally suggesting prior cataract surgery. Visualized paranasal sinuses are grossly clear. Mastoid air cells are grossly clear     Impression: Impression: 1. No acute ischemic change 2. White matter changes compatible with small vessel ischemic disease in this age group. Electronically Signed: Bassam Anglin MD  9/13/2024 2:13 PM EDT  Workstation ID: [id number]    CT Angiogram Head w AI Analysis of LVO    Result Date: 9/13/2024  CT ANGIOGRAM HEAD W AI ANALYSIS OF LVO, CT CEREBRAL PERFUSION W WO CONTRAST, CT ANGIOGRAM NECK Date of Exam: 9/13/2024 12:54 PM EDT Indication: Neuro Deficit, acute, Stroke suspected Neuro deficit, acute stroke suspected. Comparison: None available. Technique: Axial CT images of the brain were obtained prior to and after the administration of 115 mL Isovue-370. CT Perfusion protocol was utilized. Automated post processing was performed by RAPID software and submitted to PACS for interpretation.  CTA  of the head and neck were performed after the administration of iodinated contrast.  Reconstructed coronal and sagittal images were also obtained. A 3-D volume rendered image was created for interpretation. Automated exposure control and iterative  reconstruction methods were used.   Findings: CT perfusion: Color maps are symmetric, without evidence of core infarct or significant territorial ischemic tissue at risk. CT ANGIOGRAM: The lung apices are clear. Evaluation of the neck soft tissues demonstrates no pathologic cervical adenopathy or unexpected aerodigestive tract mass. The osseous structures demonstrate multilevel spondylosis, without evidence of acute fracture or aggressive osseous lesion. Patent aortic arch with three-vessel branching. The visualized subclavian arteries are patent bilaterally. Calcific atherosclerotic changes are present at both ICA origins with mild, less than 50% stenosis present bilaterally. The right vertebral artery appears mildly diminutive but otherwise patent. Patent dominant left vertebral artery. Intracranially, the carotid siphons demonstrate minimal calcific atherosclerotic change without associated stenosis. The anterior cerebral arteries appear normal. The right and the left middle cerebral arteries demonstrate no evidence of flow-limiting stenosis, large vessel occlusion or aneurysm. The vertebral basilar system appears diminutive but patent, with tapering of the basilar artery and prominent filling of both posterior cerebral arteries via patent posterior communicating arteries. The posterior cerebral arteries are patent bilaterally.     Impression: Impression: CT perfusion demonstrates no evidence of core infarct or significant territorial ischemic tissue at risk. Mild cervical and intracranial atherosclerotic changes are present without evidence of high-grade flow-limiting stenosis, large vessel occlusion or aneurysm. Incidentally noted anterior dominant intracranial circulation with tapering of the basilar artery, essentially terminating with both superior cerebellar arteries and prominent filling of bilateral PCAs via patent bilateral posterior communicating arteries. Electronically Signed: Brain Asencio MD   9/13/2024 1:18 PM EDT  Workstation ID: [id number]    CT Angiogram Neck    Result Date: 9/13/2024  CT ANGIOGRAM HEAD W AI ANALYSIS OF LVO, CT CEREBRAL PERFUSION W WO CONTRAST, CT ANGIOGRAM NECK Date of Exam: 9/13/2024 12:54 PM EDT Indication: Neuro Deficit, acute, Stroke suspected Neuro deficit, acute stroke suspected. Comparison: None available. Technique: Axial CT images of the brain were obtained prior to and after the administration of 115 mL Isovue-370. CT Perfusion protocol was utilized. Automated post processing was performed by RAPID software and submitted to PACS for interpretation.  CTA  of the head and neck were performed after the administration of iodinated contrast.  Reconstructed coronal and sagittal images were also obtained. A 3-D volume rendered image was created for interpretation. Automated exposure control and iterative reconstruction methods were used.   Findings: CT perfusion: Color maps are symmetric, without evidence of core infarct or significant territorial ischemic tissue at risk. CT ANGIOGRAM: The lung apices are clear. Evaluation of the neck soft tissues demonstrates no pathologic cervical adenopathy or unexpected aerodigestive tract mass. The osseous structures demonstrate multilevel spondylosis, without evidence of acute fracture or aggressive osseous lesion. Patent aortic arch with three-vessel branching. The visualized subclavian arteries are patent bilaterally. Calcific atherosclerotic changes are present at both ICA origins with mild, less than 50% stenosis present bilaterally. The right vertebral artery appears mildly diminutive but otherwise patent. Patent dominant left vertebral artery. Intracranially, the carotid siphons demonstrate minimal calcific atherosclerotic change without associated stenosis. The anterior cerebral arteries appear normal. The right and the left middle cerebral arteries demonstrate no evidence of flow-limiting stenosis, large vessel occlusion or aneurysm.  The vertebral basilar system appears diminutive but patent, with tapering of the basilar artery and prominent filling of both posterior cerebral arteries via patent posterior communicating arteries. The posterior cerebral arteries are patent bilaterally.     Impression: Impression: CT perfusion demonstrates no evidence of core infarct or significant territorial ischemic tissue at risk. Mild cervical and intracranial atherosclerotic changes are present without evidence of high-grade flow-limiting stenosis, large vessel occlusion or aneurysm. Incidentally noted anterior dominant intracranial circulation with tapering of the basilar artery, essentially terminating with both superior cerebellar arteries and prominent filling of bilateral PCAs via patent bilateral posterior communicating arteries. Electronically Signed: Brain Asencio MD  9/13/2024 1:18 PM EDT  Workstation ID: [id number]    CT CEREBRAL PERFUSION WITH & WITHOUT CONTRAST    Result Date: 9/13/2024  CT ANGIOGRAM HEAD W AI ANALYSIS OF LVO, CT CEREBRAL PERFUSION W WO CONTRAST, CT ANGIOGRAM NECK Date of Exam: 9/13/2024 12:54 PM EDT Indication: Neuro Deficit, acute, Stroke suspected Neuro deficit, acute stroke suspected. Comparison: None available. Technique: Axial CT images of the brain were obtained prior to and after the administration of 115 mL Isovue-370. CT Perfusion protocol was utilized. Automated post processing was performed by RAPID software and submitted to PACS for interpretation.  CTA  of the head and neck were performed after the administration of iodinated contrast.  Reconstructed coronal and sagittal images were also obtained. A 3-D volume rendered image was created for interpretation. Automated exposure control and iterative reconstruction methods were used.   Findings: CT perfusion: Color maps are symmetric, without evidence of core infarct or significant territorial ischemic tissue at risk. CT ANGIOGRAM: The lung apices are clear. Evaluation of  the neck soft tissues demonstrates no pathologic cervical adenopathy or unexpected aerodigestive tract mass. The osseous structures demonstrate multilevel spondylosis, without evidence of acute fracture or aggressive osseous lesion. Patent aortic arch with three-vessel branching. The visualized subclavian arteries are patent bilaterally. Calcific atherosclerotic changes are present at both ICA origins with mild, less than 50% stenosis present bilaterally. The right vertebral artery appears mildly diminutive but otherwise patent. Patent dominant left vertebral artery. Intracranially, the carotid siphons demonstrate minimal calcific atherosclerotic change without associated stenosis. The anterior cerebral arteries appear normal. The right and the left middle cerebral arteries demonstrate no evidence of flow-limiting stenosis, large vessel occlusion or aneurysm. The vertebral basilar system appears diminutive but patent, with tapering of the basilar artery and prominent filling of both posterior cerebral arteries via patent posterior communicating arteries. The posterior cerebral arteries are patent bilaterally.     Impression: Impression: CT perfusion demonstrates no evidence of core infarct or significant territorial ischemic tissue at risk. Mild cervical and intracranial atherosclerotic changes are present without evidence of high-grade flow-limiting stenosis, large vessel occlusion or aneurysm. Incidentally noted anterior dominant intracranial circulation with tapering of the basilar artery, essentially terminating with both superior cerebellar arteries and prominent filling of bilateral PCAs via patent bilateral posterior communicating arteries. Electronically Signed: Brain Asencio MD  9/13/2024 1:18 PM EDT  Workstation ID: [id number]    CT Head Without Contrast Stroke Protocol    Result Date: 9/13/2024  CT HEAD WO CONTRAST STROKE PROTOCOL Date of Exam: 9/13/2024 12:45 PM EDT Indication: Neuro deficit, acute,  stroke suspected Neuro Deficit, acute, Stroke suspected. Comparison: None available. Technique: Axial CT images were obtained of the head without contrast administration.  Reconstructed coronal images were also obtained. Automated exposure control and iterative construction methods were used. Scan Time: 12:47 Results discussed with the stroke team in person at 12:50. Findings: No evidence of acute intracranial hemorrhage or mass effect. No extra-axial collection. The gray white matter differentiation is preserved. Ventricles and sulci are symmetric. Mild periventricular and subcortical white matter hypodensity, nonspecific, but likely sequela of chronic small vessel ischemic disease. The mastoid air cells and paranasal sinuses are well aerated. Globes and extraocular muscles are unremarkable. No acute or suspicious osseous abnormality. Soft tissues within normal limits.     Impression: Impression: No evidence of acute intracranial abnormality. Typical chronic/age-related findings including white matter change suggestive of mild chronic small vessel ischemia. Electronically Signed: John Saldaña MD  9/13/2024 12:58 PM EDT  Workstation ID: [id number]         Assessment & Plan   Assessment & Plan       Vision changes    HTN (hypertension)    DMII (diabetes mellitus, type 2)    Tremor    Dizziness  Blurred Vision   -occurred after giving blood yesterday  -CT head is negative for acute abnormality  -CT perfusion negative  -CTA head and neck is negative for flow-limiting stenosis, no LVO or aneurysm.  -MRI brain is negative for acute stroke  -EKG NSR  -Troponin negative  -Orthostatics   -Echo pending   -PT/OT  -Patient is already scheduled for an eye exam at end of the month. Uses reading glasses at baseline.    R sided Tremor   -Started on Carbidopa-Levodopa per Neurology in July   -Per patient, she was not diagnosed with Parkinson's   -She thinks it may be related to her hand surgery from 2022 as started afterwards      Pre-diabetes (per patient)  -AM Hga1c  -On Metformin at home  -LDSSI     HTN  -Continue home Lisinopril-HCTZ    VTE Prophylaxis:  Mechanical VTE prophylaxis orders are present.          CODE STATUS:    Code Status and Medical Interventions: No CPR (Do Not Attempt to Resuscitate); Limited Support; No intubation (DNI)   Ordered at: 09/13/24 1551     Medical Intervention Limits:    No intubation (DNI)     Level Of Support Discussed With:    Patient     Code Status (Patient has no pulse and is not breathing):    No CPR (Do Not Attempt to Resuscitate)     Medical Interventions (Patient has pulse or is breathing):    Limited Support       Expected Discharge   Expected Discharge Date: 9/14/2024; Expected Discharge Time:      Gayle Recinos DO  09/13/24
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
## 2024-09-14 ENCOUNTER — APPOINTMENT (OUTPATIENT)
Dept: CARDIOLOGY | Facility: HOSPITAL | Age: 69
End: 2024-09-14
Payer: MEDICARE

## 2024-09-14 VITALS
HEIGHT: 60 IN | WEIGHT: 178.57 LBS | HEART RATE: 87 BPM | DIASTOLIC BLOOD PRESSURE: 57 MMHG | TEMPERATURE: 98.3 F | RESPIRATION RATE: 18 BRPM | BODY MASS INDEX: 35.06 KG/M2 | SYSTOLIC BLOOD PRESSURE: 104 MMHG | OXYGEN SATURATION: 99 %

## 2024-09-14 PROBLEM — H53.9 VISION CHANGES: Status: RESOLVED | Noted: 2024-09-13 | Resolved: 2024-09-14

## 2024-09-14 LAB
ANION GAP SERPL CALCULATED.3IONS-SCNC: 8 MMOL/L (ref 5–15)
BUN SERPL-MCNC: 17 MG/DL (ref 8–23)
BUN/CREAT SERPL: 17.3 (ref 7–25)
CALCIUM SPEC-SCNC: 9 MG/DL (ref 8.6–10.5)
CHLORIDE SERPL-SCNC: 101 MMOL/L (ref 98–107)
CO2 SERPL-SCNC: 28 MMOL/L (ref 22–29)
CREAT SERPL-MCNC: 0.98 MG/DL (ref 0.57–1)
DEPRECATED RDW RBC AUTO: 43.3 FL (ref 37–54)
EGFRCR SERPLBLD CKD-EPI 2021: 62.2 ML/MIN/1.73
ERYTHROCYTE [DISTWIDTH] IN BLOOD BY AUTOMATED COUNT: 12.8 % (ref 12.3–15.4)
GLUCOSE BLDC GLUCOMTR-MCNC: 130 MG/DL (ref 70–130)
GLUCOSE BLDC GLUCOMTR-MCNC: 370 MG/DL (ref 70–130)
GLUCOSE BLDC GLUCOMTR-MCNC: 423 MG/DL (ref 70–130)
GLUCOSE SERPL-MCNC: 137 MG/DL (ref 65–99)
HBA1C MFR BLD: 6.7 % (ref 4.8–5.6)
HCT VFR BLD AUTO: 31.7 % (ref 34–46.6)
HGB BLD-MCNC: 10.8 G/DL (ref 12–15.9)
MCH RBC QN AUTO: 31.5 PG (ref 26.6–33)
MCHC RBC AUTO-ENTMCNC: 34.1 G/DL (ref 31.5–35.7)
MCV RBC AUTO: 92.4 FL (ref 79–97)
PLATELET # BLD AUTO: 290 10*3/MM3 (ref 140–450)
PMV BLD AUTO: 10 FL (ref 6–12)
POTASSIUM SERPL-SCNC: 4.8 MMOL/L (ref 3.5–5.2)
QT INTERVAL: 362 MS
QTC INTERVAL: 425 MS
RBC # BLD AUTO: 3.43 10*6/MM3 (ref 3.77–5.28)
SODIUM SERPL-SCNC: 137 MMOL/L (ref 136–145)
WBC NRBC COR # BLD AUTO: 6.15 10*3/MM3 (ref 3.4–10.8)

## 2024-09-14 PROCEDURE — 63710000001 INSULIN LISPRO (HUMAN) PER 5 UNITS: Performed by: FAMILY MEDICINE

## 2024-09-14 PROCEDURE — 93306 TTE W/DOPPLER COMPLETE: CPT

## 2024-09-14 PROCEDURE — 93306 TTE W/DOPPLER COMPLETE: CPT | Performed by: INTERNAL MEDICINE

## 2024-09-14 PROCEDURE — 99214 OFFICE O/P EST MOD 30 MIN: CPT | Performed by: STUDENT IN AN ORGANIZED HEALTH CARE EDUCATION/TRAINING PROGRAM

## 2024-09-14 PROCEDURE — G0378 HOSPITAL OBSERVATION PER HR: HCPCS

## 2024-09-14 PROCEDURE — 97165 OT EVAL LOW COMPLEX 30 MIN: CPT

## 2024-09-14 PROCEDURE — 99239 HOSP IP/OBS DSCHRG MGMT >30: CPT | Performed by: INTERNAL MEDICINE

## 2024-09-14 PROCEDURE — 80048 BASIC METABOLIC PNL TOTAL CA: CPT | Performed by: FAMILY MEDICINE

## 2024-09-14 PROCEDURE — 85027 COMPLETE CBC AUTOMATED: CPT | Performed by: FAMILY MEDICINE

## 2024-09-14 PROCEDURE — 82948 REAGENT STRIP/BLOOD GLUCOSE: CPT

## 2024-09-14 PROCEDURE — 83036 HEMOGLOBIN GLYCOSYLATED A1C: CPT | Performed by: FAMILY MEDICINE

## 2024-09-14 RX ORDER — ATORVASTATIN CALCIUM 40 MG/1
40 TABLET, FILM COATED ORAL NIGHTLY
Qty: 90 TABLET | Refills: 0 | Status: SHIPPED | OUTPATIENT
Start: 2024-09-14 | End: 2024-09-14

## 2024-09-14 RX ORDER — ATORVASTATIN CALCIUM 40 MG/1
40 TABLET, FILM COATED ORAL NIGHTLY
Status: DISCONTINUED | OUTPATIENT
Start: 2024-09-14 | End: 2024-09-14 | Stop reason: HOSPADM

## 2024-09-14 RX ORDER — ASPIRIN 81 MG/1
81 TABLET, CHEWABLE ORAL DAILY
Qty: 90 TABLET | Refills: 0 | Status: SHIPPED | OUTPATIENT
Start: 2024-09-15 | End: 2024-09-14

## 2024-09-14 RX ORDER — ASPIRIN 81 MG/1
81 TABLET, CHEWABLE ORAL DAILY
Qty: 90 TABLET | Refills: 0 | Status: SHIPPED | OUTPATIENT
Start: 2024-09-15

## 2024-09-14 RX ORDER — ATORVASTATIN CALCIUM 40 MG/1
40 TABLET, FILM COATED ORAL NIGHTLY
Qty: 90 TABLET | Refills: 0 | Status: SHIPPED | OUTPATIENT
Start: 2024-09-14

## 2024-09-14 RX ADMIN — ASPIRIN 81 MG 81 MG: 81 TABLET ORAL at 08:34

## 2024-09-14 RX ADMIN — HYDROCHLOROTHIAZIDE 25 MG: 25 TABLET ORAL at 08:34

## 2024-09-14 RX ADMIN — INSULIN LISPRO 6 UNITS: 100 INJECTION, SOLUTION INTRAVENOUS; SUBCUTANEOUS at 08:34

## 2024-09-14 RX ADMIN — CARBIDOPA AND LEVODOPA 1 TABLET: 25; 100 TABLET ORAL at 08:33

## 2024-09-14 RX ADMIN — LISINOPRIL 20 MG: 20 TABLET ORAL at 08:33

## 2024-09-14 NOTE — DISCHARGE SUMMARY
[facility] Medicine Services  DISCHARGE SUMMARY    Patient Name: Lizbet Flores  : 1955  MRN: [id number]    Date of Admission: 2024 12:41 PM  Date of Discharge:  2024  Primary Care Physician: Sonny uGzman MD    Consults       Date and Time Order Name Status Description    2024 12:43 PM Inpatient Neurology Consult Stroke Completed             Hospital Course     Presenting Problem: transient blurry vision/presyncope    Active Hospital Problems    Diagnosis  POA    Tremor [R25.1]  Yes    HTN (hypertension) [I10]  Yes    DMII (diabetes mellitus, type 2) [E11.9]  Yes      Resolved Hospital Problems    Diagnosis Date Resolved POA    **Vision changes [H53.9] 2024 Yes          Hospital Course:  Lizbet Flores is a 69 y.o. female w DMII, HTN, tremor who presented w episode of blurred vision and lightheadedness which self-resolved after 1 hour. Of note, gave blood the day before.   Admitted as stroke rule-out. MRI performed which revealed old silent CVA (right pontine infarct) but no acute infarct.  Symptoms had resolved after arrival, other work-up unremarkable.    Discharged home on new daily aspirin + statin given old CVA. No other changes to home medications at time of discharge and did well with PT/OT    *After discharge, ECHO final read reviewed 9/15 AM findings of cavity collapse w hypertrophy present. I called and d/w Dr Maldonado who read the ECHO best next steps. In setting of patient hydration status post blood transfusion + these findings, likely reason for presyncope.  Plan as follows communicated to patient by phone 9/15 as well as her daughter who is a nurse, also given my contact number if any issues:  -Stop lisinopril-HCTZ  -Start lisinopril + start low dose metoprolol  -Cardiology clinic to call patient and arrange follow-up  -No blood donation, remain hydrated in mean-time    All amenable to plan. Will reach out to PCP     Discharge  Follow Up Recommendations for outpatient labs/diagnostics:   F/u PCP 1 week   F/u stroke clinic in 4-6 weeks    Day of Discharge     HPI:   Doing well today without any persistent symptoms  Symptoms more lightheadedness/presyncope than vertigo    Vital Signs:   Temp:  [98.3 °F (36.8 °C)] 98.3 °F (36.8 °C)  Heart Rate:  [87] 87  Resp:  [18] 18  BP: (104)/(57) 104/57      Physical Exam:  Constitutional: No acute distress, awake, alert female sitting up in chair. Family closeby  HENT: NCAT, mucous membranes moist  Respiratory: Clear to auscultation bilaterally, respiratory effort normal   Cardiovascular: RRR, no murmurs, rubs, or gallops  Gastrointestinal: Soft, nontender, nondistended  Musculoskeletal: Muscle tone within normal limits, no joint effusions appreciated  Psychiatric: Appropriate affect, cooperative  Neurologic: Alert and oriented, facial movements symmetric and spontaneous movement of all 4 extremities grossly equal bilaterally, speech clear  Skin: No rashes    Pertinent  and/or Most Recent Results     LAB RESULTS:      Lab 09/14/24  0556 09/13/24 1255 09/13/24 1254 09/13/24  1252   WBC 6.15  --   --  7.33   HEMOGLOBIN 10.8*  --   --  11.3*   HEMOGLOBIN, POC  --  10.9* 10.9*  --    HEMATOCRIT 31.7*  --   --  32.9*   HEMATOCRIT POC  --  32* 32*  --    PLATELETS 290  --   --  290   NEUTROS ABS  --   --   --  3.98   IMMATURE GRANS (ABS)  --   --   --  0.03   LYMPHS ABS  --   --   --  2.55   MONOS ABS  --   --   --  0.67   EOS ABS  --   --   --  0.07   MCV 92.4  --   --  93.7   APTT  --   --   --  29.9         Lab 09/14/24  0556 09/13/24  1255 09/13/24  1254 09/13/24  1252   SODIUM 137  --   --  136   POTASSIUM 4.8  --   --  4.2   CHLORIDE 101  --   --  100   CO2 28.0  --   --  25.0   ANION GAP 8.0  --   --  11.0   BUN 17  --   --  21   CREATININE 0.98 1.00 1.00 0.89   EGFR 62.2  --  60.7 69.8   GLUCOSE 137*  --   --  109*   CALCIUM 9.0  --   --  8.8   MAGNESIUM  --   --   --  1.9   HEMOGLOBIN A1C 6.70*  --    --   --    TSH  --   --   --  0.502         Lab 09/13/24  1252   TOTAL PROTEIN 7.1   ALBUMIN 3.8   GLOBULIN 3.3   ALT (SGPT) 7   AST (SGOT) 18   BILIRUBIN 0.2   ALK PHOS 101         Lab 09/13/24  1252   HSTROP T 10                 Brief Urine Lab Results  (Last result in the past 365 days)        Color   Clarity   Blood   Leuk Est   Nitrite   Protein   CREAT   Urine HCG        09/13/24 1324 Yellow   Cloudy   Negative   Small (1+)   Negative   Negative                 Microbiology Results (last 10 days)       ** No results found for the last 240 hours. **            Adult Transthoracic Echo Complete W/ Cont if Necessary Per Protocol    Result Date: 9/15/2024    Left ventricular systolic function is hyperdynamic (EF > 70%). Estimated left ventricular EF = 85%   Near cavitary obliteration at rest.   Resting LVOT gradient of 37 mmHg through the LVOT increasing to 62 mmHg with Valsalva.   Left ventricular wall thickness is consistent with borderline concentric hypertrophy.   Saline test results are negative for intracardiac shunting.   Trace to mild aortic and tricuspid insufficiency.   Based on this study the near cavitary obliteration with rest is the most likely source for this patient's syncope/presyncope.     XR Chest 1 View    Result Date: 9/13/2024  XR CHEST 1 VW Date of Exam: 9/13/2024 2:03 PM EDT Indication: Weak/Dizzy/AMS triage protocol Comparison: 2/18/2024 Findings: Cardiomediastinal silhouette is unremarkable. There is atherosclerosis of the aorta. There is an opacity at the left lung base in the region of the costophrenic angle which is similar to the previous study. Lungs are otherwise clear. No definite pleural effusion or pneumothorax. There are degenerative changes of the shoulders.     Impression: Left basilar opacity in the region of the costophrenic angle which is similar to the previous study. This could represent a prominent epicardial fat pad shadow; however, infiltrate is not excluded. CT of  the chest is recommended for further assessment. Electronically Signed: Lexy Bocanegra MD  9/13/2024 2:25 PM EDT  Workstation ID: [id number]    MRI Brain Without Contrast    Result Date: 9/13/2024  MRI BRAIN WO CONTRAST Date of Exam: 9/13/2024 1:30 PM EDT Indication: dizziness, blurred vision, right arm numbness.  Comparison: CTs same day Technique:  Routine multiplanar/multisequence sequence images of the brain were obtained without contrast administration. Findings: Diffusion weighted imaging demonstrates no acute restriction abnormality. Midline structures of the brain are grossly unremarkable in appearance. Pituitary and sella structures and craniocervical junction are grossly unremarkable in appearance. The ventricles, cisterns and sulci appear within normal limits. Hyperostosis frontalis noted bilaterally. No suspicious extra-axial fluid collection. No acute intracranial hemorrhage or mass effect. Minimal FLAIR and T2 signal abnormality within the supratentorial white matter predominantly within the right frontal lobe noted. Findings are nonspecific but are most compatible with sequela small vessel ischemic disease in this age group. No acute intracranial hemorrhage or mass effect. Area of susceptibility artifact within the right side of the pa, midbrain suggest sequela of  microinfarct and associated hemosiderin distribution. The major intracranial flow voids appear patent. Thinning of the lenses noted in the orbits bilaterally suggesting prior cataract surgery. Visualized paranasal sinuses are grossly clear. Mastoid air cells are grossly clear     Impression: 1. No acute ischemic change 2. White matter changes compatible with small vessel ischemic disease in this age group. Electronically Signed: Bassam Anglin MD  9/13/2024 2:13 PM EDT  Workstation ID: [id number]    CT Angiogram Head w AI Analysis of LVO    Result Date: 9/13/2024  CT ANGIOGRAM HEAD W AI ANALYSIS OF LVO, CT CEREBRAL PERFUSION W WO CONTRAST,  CT ANGIOGRAM NECK Date of Exam: 9/13/2024 12:54 PM EDT Indication: Neuro Deficit, acute, Stroke suspected Neuro deficit, acute stroke suspected. Comparison: None available. Technique: Axial CT images of the brain were obtained prior to and after the administration of 115 mL Isovue-370. CT Perfusion protocol was utilized. Automated post processing was performed by RAPID software and submitted to PACS for interpretation.  CTA  of the head and neck were performed after the administration of iodinated contrast.  Reconstructed coronal and sagittal images were also obtained. A 3-D volume rendered image was created for interpretation. Automated exposure control and iterative reconstruction methods were used.   Findings: CT perfusion: Color maps are symmetric, without evidence of core infarct or significant territorial ischemic tissue at risk. CT ANGIOGRAM: The lung apices are clear. Evaluation of the neck soft tissues demonstrates no pathologic cervical adenopathy or unexpected aerodigestive tract mass. The osseous structures demonstrate multilevel spondylosis, without evidence of acute fracture or aggressive osseous lesion. Patent aortic arch with three-vessel branching. The visualized subclavian arteries are patent bilaterally. Calcific atherosclerotic changes are present at both ICA origins with mild, less than 50% stenosis present bilaterally. The right vertebral artery appears mildly diminutive but otherwise patent. Patent dominant left vertebral artery. Intracranially, the carotid siphons demonstrate minimal calcific atherosclerotic change without associated stenosis. The anterior cerebral arteries appear normal. The right and the left middle cerebral arteries demonstrate no evidence of flow-limiting stenosis, large vessel occlusion or aneurysm. The vertebral basilar system appears diminutive but patent, with tapering of the basilar artery and prominent filling of both posterior cerebral arteries via patent posterior  communicating arteries. The posterior cerebral arteries are patent bilaterally.     Impression: CT perfusion demonstrates no evidence of core infarct or significant territorial ischemic tissue at risk. Mild cervical and intracranial atherosclerotic changes are present without evidence of high-grade flow-limiting stenosis, large vessel occlusion or aneurysm. Incidentally noted anterior dominant intracranial circulation with tapering of the basilar artery, essentially terminating with both superior cerebellar arteries and prominent filling of bilateral PCAs via patent bilateral posterior communicating arteries. Electronically Signed: Brain Asencio MD  9/13/2024 1:18 PM EDT  Workstation ID: [id number]    CT Angiogram Neck    Result Date: 9/13/2024  CT ANGIOGRAM HEAD W AI ANALYSIS OF LVO, CT CEREBRAL PERFUSION W WO CONTRAST, CT ANGIOGRAM NECK Date of Exam: 9/13/2024 12:54 PM EDT Indication: Neuro Deficit, acute, Stroke suspected Neuro deficit, acute stroke suspected. Comparison: None available. Technique: Axial CT images of the brain were obtained prior to and after the administration of 115 mL Isovue-370. CT Perfusion protocol was utilized. Automated post processing was performed by RAPID software and submitted to PACS for interpretation.  CTA  of the head and neck were performed after the administration of iodinated contrast.  Reconstructed coronal and sagittal images were also obtained. A 3-D volume rendered image was created for interpretation. Automated exposure control and iterative reconstruction methods were used.   Findings: CT perfusion: Color maps are symmetric, without evidence of core infarct or significant territorial ischemic tissue at risk. CT ANGIOGRAM: The lung apices are clear. Evaluation of the neck soft tissues demonstrates no pathologic cervical adenopathy or unexpected aerodigestive tract mass. The osseous structures demonstrate multilevel spondylosis, without evidence of acute fracture or  aggressive osseous lesion. Patent aortic arch with three-vessel branching. The visualized subclavian arteries are patent bilaterally. Calcific atherosclerotic changes are present at both ICA origins with mild, less than 50% stenosis present bilaterally. The right vertebral artery appears mildly diminutive but otherwise patent. Patent dominant left vertebral artery. Intracranially, the carotid siphons demonstrate minimal calcific atherosclerotic change without associated stenosis. The anterior cerebral arteries appear normal. The right and the left middle cerebral arteries demonstrate no evidence of flow-limiting stenosis, large vessel occlusion or aneurysm. The vertebral basilar system appears diminutive but patent, with tapering of the basilar artery and prominent filling of both posterior cerebral arteries via patent posterior communicating arteries. The posterior cerebral arteries are patent bilaterally.     Impression: CT perfusion demonstrates no evidence of core infarct or significant territorial ischemic tissue at risk. Mild cervical and intracranial atherosclerotic changes are present without evidence of high-grade flow-limiting stenosis, large vessel occlusion or aneurysm. Incidentally noted anterior dominant intracranial circulation with tapering of the basilar artery, essentially terminating with both superior cerebellar arteries and prominent filling of bilateral PCAs via patent bilateral posterior communicating arteries. Electronically Signed: Brain Asencio MD  9/13/2024 1:18 PM EDT  Workstation ID: [id number]    CT CEREBRAL PERFUSION WITH & WITHOUT CONTRAST    Result Date: 9/13/2024  CT ANGIOGRAM HEAD W AI ANALYSIS OF LVO, CT CEREBRAL PERFUSION W WO CONTRAST, CT ANGIOGRAM NECK Date of Exam: 9/13/2024 12:54 PM EDT Indication: Neuro Deficit, acute, Stroke suspected Neuro deficit, acute stroke suspected. Comparison: None available. Technique: Axial CT images of the brain were obtained prior to and after  the administration of 115 mL Isovue-370. CT Perfusion protocol was utilized. Automated post processing was performed by RAPID software and submitted to PACS for interpretation.  CTA  of the head and neck were performed after the administration of iodinated contrast.  Reconstructed coronal and sagittal images were also obtained. A 3-D volume rendered image was created for interpretation. Automated exposure control and iterative reconstruction methods were used.   Findings: CT perfusion: Color maps are symmetric, without evidence of core infarct or significant territorial ischemic tissue at risk. CT ANGIOGRAM: The lung apices are clear. Evaluation of the neck soft tissues demonstrates no pathologic cervical adenopathy or unexpected aerodigestive tract mass. The osseous structures demonstrate multilevel spondylosis, without evidence of acute fracture or aggressive osseous lesion. Patent aortic arch with three-vessel branching. The visualized subclavian arteries are patent bilaterally. Calcific atherosclerotic changes are present at both ICA origins with mild, less than 50% stenosis present bilaterally. The right vertebral artery appears mildly diminutive but otherwise patent. Patent dominant left vertebral artery. Intracranially, the carotid siphons demonstrate minimal calcific atherosclerotic change without associated stenosis. The anterior cerebral arteries appear normal. The right and the left middle cerebral arteries demonstrate no evidence of flow-limiting stenosis, large vessel occlusion or aneurysm. The vertebral basilar system appears diminutive but patent, with tapering of the basilar artery and prominent filling of both posterior cerebral arteries via patent posterior communicating arteries. The posterior cerebral arteries are patent bilaterally.     Impression: CT perfusion demonstrates no evidence of core infarct or significant territorial ischemic tissue at risk. Mild cervical and intracranial  atherosclerotic changes are present without evidence of high-grade flow-limiting stenosis, large vessel occlusion or aneurysm. Incidentally noted anterior dominant intracranial circulation with tapering of the basilar artery, essentially terminating with both superior cerebellar arteries and prominent filling of bilateral PCAs via patent bilateral posterior communicating arteries. Electronically Signed: Brain Asencio MD  9/13/2024 1:18 PM EDT  Workstation ID: [id number]    CT Head Without Contrast Stroke Protocol    Result Date: 9/13/2024  CT HEAD WO CONTRAST STROKE PROTOCOL Date of Exam: 9/13/2024 12:45 PM EDT Indication: Neuro deficit, acute, stroke suspected Neuro Deficit, acute, Stroke suspected. Comparison: None available. Technique: Axial CT images were obtained of the head without contrast administration.  Reconstructed coronal images were also obtained. Automated exposure control and iterative construction methods were used. Scan Time: 12:47 Results discussed with the stroke team in person at 12:50. Findings: No evidence of acute intracranial hemorrhage or mass effect. No extra-axial collection. The gray white matter differentiation is preserved. Ventricles and sulci are symmetric. Mild periventricular and subcortical white matter hypodensity, nonspecific, but likely sequela of chronic small vessel ischemic disease. The mastoid air cells and paranasal sinuses are well aerated. Globes and extraocular muscles are unremarkable. No acute or suspicious osseous abnormality. Soft tissues within normal limits.     Impression: No evidence of acute intracranial abnormality. Typical chronic/age-related findings including white matter change suggestive of mild chronic small vessel ischemia. Electronically Signed: John Saldaña MD  9/13/2024 12:58 PM EDT  Workstation ID: [id number]             Results for orders placed during the hospital encounter of 09/13/24    Adult Transthoracic Echo Complete W/ Cont if Necessary Per  Protocol    Interpretation Summary    Left ventricular systolic function is hyperdynamic (EF > 70%). Estimated left ventricular EF = 85%    Near cavitary obliteration at rest.    Resting LVOT gradient of 37 mmHg through the LVOT increasing to 62 mmHg with Valsalva.    Left ventricular wall thickness is consistent with borderline concentric hypertrophy.    Saline test results are negative for intracardiac shunting.    Trace to mild aortic and tricuspid insufficiency.    Based on this study the near cavitary obliteration with rest is the most likely source for this patient's syncope/presyncope.      Plan for Follow-up of Pending Labs/Results:     Discharge Details        Discharge Medications        New Medications        Instructions Start Date   aspirin 81 MG chewable tablet   Chew & swallow 1 tablet by mouth Daily.      atorvastatin 40 MG tablet  Commonly known as: LIPITOR   40 mg, Oral, Nightly      lisinopril 20 MG tablet  Commonly known as: PRINIVIL,ZESTRIL   20 mg, Oral, Daily      metoprolol succinate XL 25 MG 24 hr tablet  Commonly known as: Toprol XL   25 mg, Oral, Daily             Continue These Medications        Instructions Start Date   carbidopa-levodopa  MG per tablet  Commonly known as: SINEMET   1 tablet, Oral, 3 Times Daily      metFORMIN 500 MG tablet  Commonly known as: GLUCOPHAGE   500 mg, Oral, Daily With Breakfast      VITAMIN-B COMPLEX PO   1 tablet, Oral, Daily             Stop These Medications      lisinopril-hydrochlorothiazide 20-25 MG per tablet  Commonly known as: PRINZIDE,ZESTORETIC              No Known Allergies      Discharge Disposition:  Home or Self Care    Diet:  Hospital:  No active diet order           Activity:      Restrictions or Other Recommendations:         CODE STATUS:    Code Status and Medical Interventions: No CPR (Do Not Attempt to Resuscitate); Limited Support; No intubation (DNI)   Ordered at: 09/13/24 1556     Medical Intervention Limits:    No intubation  (DNI)     Level Of Support Discussed With:    Patient     Code Status (Patient has no pulse and is not breathing):    No CPR (Do Not Attempt to Resuscitate)     Medical Interventions (Patient has pulse or is breathing):    Limited Support       No future appointments.    Additional Instructions for the Follow-ups that You Need to Schedule       Ambulatory Referral to Neurology   As directed      6-8 weeks    Order Comments: 6-8 weeks         Discharge Follow-up with PCP   As directed       Currently Documented PCP:    Sonny Guzman MD    PCP Phone Number:    [phone]     Follow Up Details: 1 week f/u                      Perla Wolf MD  09/15/24      Time Spent on Discharge:  I spent  40  minutes on this discharge activity which included: face-to-face encounter with the patient, reviewing the data in the system, coordination of the care with the nursing staff as well as consultants, documentation, and entering orders.

## 2024-09-14 NOTE — THERAPY DISCHARGE NOTE
Acute Care - Occupational Therapy Discharge  [facility]    Patient Name: Lizbet Flores  : 1955    MRN: [id number]                              Today's Date: 2024       Admit Date: 2024    Visit Dx:     ICD-10-CM ICD-9-CM   1. Persistent vertigo of central origin  H81.4 386.2   2. Blurred vision, bilateral  H53.8 368.8   3. Tremor  R25.1 781.0     Patient Active Problem List   Diagnosis    COVID-19    HTN (hypertension)    DMII (diabetes mellitus, type 2)    Cytokine release syndrome, grade 1    Vision changes    Tremor     Past Medical History:   Diagnosis Date    Diabetes mellitus     Hypertension     Tremor      History reviewed. No pertinent surgical history.   General Information       Row Name 24          OT Time and Intention    Document Type discharge evaluation/summary  -CS     Mode of Treatment occupational therapy  -CS       Row Name 24          General Information    Patient Profile Reviewed yes  -CS     Prior Level of Function independent:;all household mobility;ADL's;driving  Reports no AD used currently for mobility, ramp to enter, sister lives nearby and grocery shops with her, walk-in shower with seating in place  -CS     Existing Precautions/Restrictions fall;other (see comments)  baseline L foot pain, recent R hand surgery  -CS     Barriers to Rehab none identified  -CS       Row Name 24          Occupational Profile    Reason for Services/Referral (Occupational Profile) stroke eval  -CS       Row Name 24          Living Environment    People in Home spouse  -CS       Row Name 24          Home Main Entrance    Number of Stairs, Main Entrance none;other (see comments)  ramp to enter  -CS       Row Name 24          Stairs Within Home, Primary    Number of Stairs, Within Home, Primary none  -CS       Row Name 24          Cognition    Orientation Status (Cognition) oriented x 4  -CS       Row Name  09/14/24 0908          Safety Issues, Functional Mobility    Safety Issues Affecting Function (Mobility) safety precaution awareness  -CS     Impairments Affecting Function (Mobility) endurance/activity tolerance  -CS     Comment, Safety Issues/Impairments (Mobility) mild foot pain w/ antalgic gait with inititial mobility  -CS               User Key  (r) = Recorded By, (t) = Taken By, (c) = Cosigned By      Initials Name Provider Type     Derek Breen OT Occupational Therapist                   Mobility/ADL's       Row Name 09/14/24 0911          Bed Mobility    Bed Mobility supine-sit;scooting/bridging  -CS     Scooting/Bridging [place] (Bed Mobility) independent  -CS     Supine-Sit [place] (Bed Mobility) standby assist  -CS     Assistive Device (Bed Mobility) bed rails;head of bed elevated  -CS     Comment, (Bed Mobility) appropriate sequencing intact, no dizziness reported with mobility  -CS       Row Name 09/14/24 0911          Transfers    Transfers sit-stand transfer;toilet transfer;bed-chair transfer  -CS     Comment, (Transfers) demo's safe use of grab bar at toilet  -       Row Name 09/14/24 0911          Bed-Chair Transfer    Bed-Chair [place] (Transfers) standby assist  -       Row Name 09/14/24 0911          Sit-Stand Transfer    Sit-Stand [place] (Transfers) supervision  -       Row Name 09/14/24 0911          Toilet Transfer    Type (Toilet Transfer) sit-stand;stand-sit  -     [place] Level (Toilet Transfer) supervision  -CS     Assistive Device (Toilet Transfer) grab bars/safety frame  -       Row Name 09/14/24 0911          Functional Mobility    Functional Mobility- Comment defer to PT for specifics, no AD for in-room distance to toilet/sinkside and recliner, reached out for doorframe for stability  -CS     Patient was able to Ambulate yes  -       Row Name 09/14/24 0911          Activities of Daily Living    BADL Assessment/Intervention upper body  dressing;lower body dressing;grooming;feeding  -CS       Row Name 09/14/24 0911          Upper Body Dressing Assessment/Training    [place] Level (Upper Body Dressing) don;pajama/robe;independent  -CS     Position (Upper Body Dressing) edge of bed sitting  -CS       Row Name 09/14/24 0911          Lower Body Dressing Assessment/Training    [place] Level (Lower Body Dressing) don;socks;standby assist  -CS     Position (Lower Body Dressing) edge of bed sitting  -CS       Row Name 09/14/24 0911          Grooming Assessment/Training    [place] Level (Grooming) wash face, hands;independent  -CS     Position (Grooming) sink side;unsupported standing  -CS       Row Name 09/14/24 0911          Self-Feeding Assessment/Training    [place] Level (Feeding) feeding skills;liquids to mouth;prepare tray/open items;scoop food and bring to mouth;independent  -CS     Position (Self-Feeding) supported sitting  -CS               User Key  (r) = Recorded By, (t) = Taken By, (c) = Cosigned By      Initials Name Provider Type    Derek Taylor OT Occupational Therapist                   Obj/Interventions       [facility] Name 09/14/24 0931          Sensory Assessment (Somatosensory)    Sensory Assessment (Somatosensory) bilateral UE  -CS     Bilateral UE Sensory Assessment general sensation;light touch awareness;light touch localization;proprioception;intact  -CS       Row Name 09/14/24 0931          Vision Assessment/Intervention    Visual Impairment/Limitations WFL  -CS       [facility] Name 09/14/24 0931          Range of Motion Comprehensive    General Range of Motion bilateral upper extremity ROM WFL  -CS     Comment, General Range of Motion mild R hand pain with full ROM  -CS       [facility] Name 09/14/24 0931          Strength Comprehensive (MMT)    General Manual Muscle Testing (MMT) Assessment upper extremity strength deficits identified  -CS     Comment, General Manual Muscle Testing (MMT) Assessment RUE grossly 4+/5  (pain/stiffness noted, likely related), LUE grossly 5/5  -CS       Row Name 09/14/24 0931          Motor Skills    Motor Skills coordination;functional endurance  -CS     Coordination bilateral;upper extremity;finger to nose;bimanual skills;WFL  -CS     Functional Endurance fair,O2 stable on RA  -CS       Row Name 09/14/24 0931          Balance    Balance Assessment sitting static balance;sitting dynamic balance;standing static balance;standing dynamic balance  -CS     Static Sitting Balance supervision  -CS     Dynamic Sitting Balance standby assist  -CS     Position, Sitting Balance unsupported;sitting edge of bed  -CS     Static Standing Balance supervision  -CS     Dynamic Standing Balance dependent  -CS     Position/Device Used, Standing Balance unsupported  -CS     Balance Interventions sitting;standing;sit to stand;occupation based/functional task  -CS     Comment, Balance no overt LOB during ADLs  -CS               User Key  (r) = Recorded By, (t) = Taken By, (c) = Cosigned By      Initials Name Provider Type    CS Derek Breen OT Occupational Therapist                   Goals/Plan    No documentation.                  Clinical Impression       Row Name 09/14/24 0932          Pain Assessment    Pretreatment Pain Rating 0/10 - no pain  -CS     Posttreatment Pain Rating 0/10 - no pain  -CS       Row Name 09/14/24 0932          Plan of Care Review    Plan of Care Reviewed With patient;sibling  -CS     Progress no change  -CS     Outcome Evaluation Pt presents at baseline for ADL completion with symmetrical BUE strength and coordination/sensation intact. OT siging off, please reconsult if needed. Rec d/c to home with assist prn when medically appropriate.  -       Row Name 09/14/24 0932          Therapy Assessment/Plan (OT)    Patient/Family Therapy Goal Statement (OT) return home  -CS     Rehab Potential (OT) good, to achieve stated therapy goals  -CS     Criteria for Skilled Therapeutic Interventions  Met (OT) yes;meets criteria;skilled treatment is necessary  -CS     Therapy Frequency (OT) daily  -CS       Row Name 09/14/24 0932          Therapy Plan Review/Discharge Plan (OT)    Anticipated Discharge Disposition (OT) home;home with assist  -CS       Row Name 09/14/24 0932          Vital Signs    Pre Systolic BP Rehab 118  RN  cleared for eval  -CS     Pre Treatment Diastolic BP 57  -CS     Post Systolic BP Rehab 118  -CS     Post Treatment Diastolic BP 67  -CS     O2 Delivery Pre Treatment room air  -CS     O2 Delivery Intra Treatment room air  -CS     O2 Delivery Post Treatment room air  -CS     Pre Patient Position Supine  -CS     Intra Patient Position Standing  -CS     Post Patient Position Sitting  -CS       Row Name 09/14/24 0932          Positioning and Restraints    Pre-Treatment Position in bed  -CS     Post Treatment Position chair  -CS     In Chair notified nsg;reclined;sitting;call light within reach;encouraged to call for assist;exit alarm on;LUE elevated;RUE elevated;on mechanical lift sling;waffle cushion;heels elevated  -CS               User Key  (r) = Recorded By, (t) = Taken By, (c) = Cosigned By      Initials Name Provider Type    CS Derek Breen, OT Occupational Therapist                   Outcome Measures       Row Name 09/14/24 0100          How much help from another person do you currently need...    Turning from your back to your side while in flat bed without using bedrails? 4  -AM     Moving from lying on back to sitting on the side of a flat bed without bedrails? 4  -AM     Moving to and from a bed to a chair (including a wheelchair)? 4  -AM     Standing up from a chair using your arms (e.g., wheelchair, bedside chair)? 4  -AM     Climbing 3-5 steps with a railing? 3  -AM     To walk in hospital room? 4  -AM     AM-PAC 6 Clicks Score (PT) 23  -AM     Highest Level of Mobility Goal 7 --> Walk 25 feet or more  -AM               User Key  (r) = Recorded By, (t) = Taken By, (c) =  Cosigned By      Initials Name Provider Type    AM Rae Noguera, RN Registered Nurse                  Occupational Therapy Education       Title: PT OT SLP Therapies (In Progress)       Topic: Occupational Therapy (In Progress)       Point: ADL training (Not Started)       Description:   Instruct learner(s) on proper safety adaptation and remediation techniques during self care or transfers.   Instruct in proper use of assistive devices.                  Learner Progress:  Not documented in this visit.              Point: Home exercise program (Not Started)       Description:   Instruct learner(s) on appropriate technique for monitoring, assisting and/or progressing therapeutic exercises/activities.                  Learner Progress:  Not documented in this visit.              Point: Precautions (Done)       Description:   Instruct learner(s) on prescribed precautions during self-care and functional transfers.                  Learning Progress Summary             Patient Acceptance, E,D, VU,DU by  at 9/14/2024 0937    Comment: in-room safety awareness, stroke s/s   Family Acceptance, E,D, VU,DU by  at 9/14/2024 0937    Comment: in-room safety awareness, stroke s/s                         Point: Body mechanics (Not Started)       Description:   Instruct learner(s) on proper positioning and spine alignment during self-care, functional mobility activities and/or exercises.                  Learner Progress:  Not documented in this visit.                              User Key       Initials Effective Dates Name Provider Type Discipline     06/16/21 -  Derek Breen OT Occupational Therapist OT                  OT Recommendation and Plan  Therapy Frequency (OT): daily  Plan of Care Review  Plan of Care Reviewed With: patient, sibling  Progress: no change  Outcome Evaluation: Pt presents at baseline for ADL completion with symmetrical BUE strength and coordination/sensation intact. OT siging off, please  reconsult if needed. Rec d/c to home with assist prn when medically appropriate.  Plan of Care Reviewed With: patient, sibling  Outcome Evaluation: Pt presents at baseline for ADL completion with symmetrical BUE strength and coordination/sensation intact. OT siging off, please reconsult if needed. Rec d/c to home with assist prn when medically appropriate.     Time Calculation:   Evaluation Complexity (OT)  Review Occupational Profile/Medical/Therapy History Complexity: brief/low complexity  Assessment, Occupational Performance/Identification of Deficit Complexity: 1-3 performance deficits  Clinical Decision Making Complexity (OT): problem focused assessment/low complexity  Overall Complexity of Evaluation (OT): low complexity     Time Calculation- OT       Row Name 09/14/24 0938             Time Calculation- OT    OT Start Time 0744  -CS      OT Received On 09/14/24  -CS         Untimed Charges    OT Eval/Re-eval Minutes 48  -CS         Total Minutes    Untimed Charges Total Minutes 48  -CS       Total Minutes 48  -CS                User Key  (r) = Recorded By, (t) = Taken By, (c) = Cosigned By      Initials Name Provider Type    CS Derek Breen OT Occupational Therapist                  Therapy Charges for Today       Code Description Service Date Service Provider Modifiers Qty    [id number]  OT EVAL LOW COMPLEXITY 4 9/14/2024 Derek Breen OT GO 1               OT Discharge Summary  Anticipated Discharge Disposition (OT): home, home with assist  Reason for Discharge: At baseline function  Discharge Destination: Home    Derek Breen OT  9/14/2024

## 2024-09-14 NOTE — PLAN OF CARE
Goal Outcome Evaluation:  Plan of Care Reviewed With: patient, sibling        Progress: no change  Outcome Evaluation: Pt presents at baseline for ADL completion with symmetrical BUE strength and coordination/sensation intact. OT siging off, please reconsult if needed. Rec d/c to home with assist prn when medically appropriate.      Anticipated Discharge Disposition (OT): home, home with assist

## 2024-09-14 NOTE — PLAN OF CARE
Goal Outcome Evaluation:         Spoke with MD. In agreement, no further need for SLP cognitive-communication assessment. Signing off.
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
## 2024-09-14 NOTE — PLAN OF CARE
VSS. A/Ox4. RA. NSR on tele. NIH: 0. No complaints of blurry vision or dizziness.     Problem: Adult Inpatient Plan of Care  Goal: Plan of Care Review  Outcome: Ongoing, Progressing  Goal: Patient-Specific Goal (Individualized)  Outcome: Ongoing, Progressing  Goal: Absence of Hospital-Acquired Illness or Injury  Outcome: Ongoing, Progressing  Intervention: Identify and Manage Fall Risk  Recent Flowsheet Documentation  Taken 9/14/2024 0400 by Rae Noguera RN  Safety Promotion/Fall Prevention:   activity supervised   assistive device/personal items within reach   clutter free environment maintained   fall prevention program maintained   muscle strengthening facilitated   nonskid shoes/slippers when out of bed   safety round/check completed   toileting scheduled  Taken 9/14/2024 0200 by Rae Noguera RN  Safety Promotion/Fall Prevention:   activity supervised   assistive device/personal items within reach   clutter free environment maintained   fall prevention program maintained   nonskid shoes/slippers when out of bed   room organization consistent   safety round/check completed   toileting scheduled  Taken 9/14/2024 0000 by Rae Nogurea RN  Safety Promotion/Fall Prevention:   activity supervised   clutter free environment maintained   assistive device/personal items within reach   fall prevention program maintained   nonskid shoes/slippers when out of bed   room organization consistent   safety round/check completed   toileting scheduled  Taken 9/13/2024 2200 by Rae Noguera RN  Safety Promotion/Fall Prevention:   activity supervised   assistive device/personal items within reach   clutter free environment maintained   fall prevention program maintained   nonskid shoes/slippers when out of bed   room organization consistent   toileting scheduled   safety round/check completed  Taken 9/13/2024 2021 by Rae Noguera, RN  Safety Promotion/Fall Prevention:   activity supervised   assistive  device/personal items within reach   nonskid shoes/slippers when out of bed   safety round/check completed   toileting scheduled  Intervention: Prevent Skin Injury  Recent Flowsheet Documentation  Taken 9/14/2024 0400 by Rae Noguera RN  Body Position: position changed independently  Taken 9/14/2024 0200 by Rae Noguera RN  Body Position: position changed independently  Taken 9/14/2024 0000 by Rae Noguera RN  Body Position: position changed independently  Taken 9/13/2024 2200 by Rae Noguera RN  Body Position: position changed independently  Taken 9/13/2024 2021 by Rae Noguera RN  Body Position:   supine   position changed independently  Intervention: Prevent and Manage VTE (Venous Thromboembolism) Risk  Recent Flowsheet Documentation  Taken 9/14/2024 0400 by Rae Noguera RN  Activity Management: activity encouraged  Taken 9/14/2024 0200 by Rae Noguera RN  Activity Management: activity encouraged  Taken 9/14/2024 0000 by Rae Noguera RN  Activity Management: activity encouraged  Taken 9/13/2024 2200 by Rae Noguera RN  Activity Management: activity encouraged  Taken 9/13/2024 2021 by Rae Noguera RN  Activity Management: activity encouraged  Goal: Optimal Comfort and Wellbeing  Outcome: Ongoing, Progressing  Intervention: Provide Person-Centered Care  Recent Flowsheet Documentation  Taken 9/13/2024 2021 by Rae Noguera RN  Trust Relationship/Rapport:   care explained   choices provided   questions answered   reassurance provided   questions encouraged  Goal: Readiness for Transition of Care  Outcome: Ongoing, Progressing     Problem: Hypertension Comorbidity  Goal: Blood Pressure in Desired Range  Outcome: Ongoing, Progressing   Goal Outcome Evaluation:

## 2024-09-15 LAB
BH CV ECHO MEAS - AI P1/2T: 506.1 MSEC
BH CV ECHO MEAS - AO MAX PG: 13.4 MMHG
BH CV ECHO MEAS - AO MEAN PG: 7 MMHG
BH CV ECHO MEAS - AO ROOT DIAM: 2.5 CM
BH CV ECHO MEAS - AO V2 MAX: 183 CM/SEC
BH CV ECHO MEAS - AO V2 VTI: 30.8 CM
BH CV ECHO MEAS - AVA(I,D): 2.37 CM2
BH CV ECHO MEAS - EDV(CUBED): 13.8 ML
BH CV ECHO MEAS - EDV(MOD-SP2): 34.8 ML
BH CV ECHO MEAS - EDV(MOD-SP4): 40.8 ML
BH CV ECHO MEAS - EF(MOD-BP): 75.1 %
BH CV ECHO MEAS - EF(MOD-SP2): 71 %
BH CV ECHO MEAS - EF(MOD-SP4): 77.8 %
BH CV ECHO MEAS - ESV(CUBED): 3.4 ML
BH CV ECHO MEAS - ESV(MOD-SP2): 10.1 ML
BH CV ECHO MEAS - ESV(MOD-SP4): 9.1 ML
BH CV ECHO MEAS - FS: 37.5 %
BH CV ECHO MEAS - IVS/LVPW: 1.1 CM
BH CV ECHO MEAS - IVSD: 1.1 CM
BH CV ECHO MEAS - LA DIMENSION: 3.3 CM
BH CV ECHO MEAS - LAT PEAK E' VEL: 11 CM/SEC
BH CV ECHO MEAS - LV DIASTOLIC VOL/BSA (35-75): 22.9 CM2
BH CV ECHO MEAS - LV MASS(C)D: 64.9 GRAMS
BH CV ECHO MEAS - LV MAX PG: 9.4 MMHG
BH CV ECHO MEAS - LV MEAN PG: 5 MMHG
BH CV ECHO MEAS - LV SYSTOLIC VOL/BSA (12-30): 5.1 CM2
BH CV ECHO MEAS - LV V1 MAX: 153 CM/SEC
BH CV ECHO MEAS - LV V1 VTI: 25.7 CM
BH CV ECHO MEAS - LVIDD: 2.4 CM
BH CV ECHO MEAS - LVIDS: 1.5 CM
BH CV ECHO MEAS - LVOT AREA: 2.8 CM2
BH CV ECHO MEAS - LVOT DIAM: 1.9 CM
BH CV ECHO MEAS - LVPWD: 1 CM
BH CV ECHO MEAS - MED PEAK E' VEL: 4.8 CM/SEC
BH CV ECHO MEAS - MV A MAX VEL: 180 CM/SEC
BH CV ECHO MEAS - MV DEC SLOPE: 418 CM/SEC2
BH CV ECHO MEAS - MV DEC TIME: 0.38 SEC
BH CV ECHO MEAS - MV E MAX VEL: 94.8 CM/SEC
BH CV ECHO MEAS - MV E/A: 0.53
BH CV ECHO MEAS - MV MAX PG: 16.5 MMHG
BH CV ECHO MEAS - MV MEAN PG: 5.5 MMHG
BH CV ECHO MEAS - MV P1/2T: 87.6 MSEC
BH CV ECHO MEAS - MV V2 VTI: 39 CM
BH CV ECHO MEAS - MVA(P1/2T): 2.5 CM2
BH CV ECHO MEAS - MVA(VTI): 1.87 CM2
BH CV ECHO MEAS - PA ACC TIME: 0.11 SEC
BH CV ECHO MEAS - SV(LVOT): 72.9 ML
BH CV ECHO MEAS - SV(MOD-SP2): 24.7 ML
BH CV ECHO MEAS - SV(MOD-SP4): 31.7 ML
BH CV ECHO MEAS - SVI(LVOT): 40.8 ML/M2
BH CV ECHO MEAS - SVI(MOD-SP2): 13.8 ML/M2
BH CV ECHO MEAS - SVI(MOD-SP4): 17.8 ML/M2
BH CV ECHO MEAS - TAPSE (>1.6): 2.17 CM
BH CV ECHO MEAS - TR MAX PG: 20.9 MMHG
BH CV ECHO MEAS - TR MAX VEL: 227.7 CM/SEC
BH CV ECHO MEASUREMENTS AVERAGE E/E' RATIO: 12
BH CV ECHO SHUNT ASSESSMENT PERFORMED (HIDDEN SCRIPTING): 1
BH CV VAS BP RIGHT ARM: NORMAL MMHG
BH CV XLRA - RV BASE: 2.2 CM
BH CV XLRA - RV LENGTH: 4.8 CM
BH CV XLRA - RV MID: 1.7 CM
BH CV XLRA - TDI S': 12.4 CM/SEC
LEFT ATRIUM VOLUME INDEX: 24.8 ML/M2
LV EF 2D ECHO EST: 85 %

## 2024-09-15 RX ORDER — LISINOPRIL 20 MG/1
20 TABLET ORAL DAILY
Qty: 30 TABLET | Refills: 0 | Status: SHIPPED | OUTPATIENT
Start: 2024-09-15 | End: 2024-10-15

## 2024-09-15 RX ORDER — METOPROLOL SUCCINATE 25 MG/1
25 TABLET, EXTENDED RELEASE ORAL DAILY
Qty: 30 TABLET | Refills: 0 | Status: SHIPPED | OUTPATIENT
Start: 2024-09-15

## 2024-09-17 ENCOUNTER — OFFICE VISIT (OUTPATIENT)
Dept: CARDIOLOGY | Facility: CLINIC | Age: 69
End: 2024-09-17
Payer: MEDICARE

## 2024-09-17 VITALS
DIASTOLIC BLOOD PRESSURE: 62 MMHG | HEIGHT: 60 IN | BODY MASS INDEX: 35.61 KG/M2 | OXYGEN SATURATION: 91 % | HEART RATE: 85 BPM | WEIGHT: 181.4 LBS | SYSTOLIC BLOOD PRESSURE: 98 MMHG

## 2024-09-17 DIAGNOSIS — R55 PRE-SYNCOPE: ICD-10-CM

## 2024-09-17 DIAGNOSIS — R09.89 HYPERDYNAMIC CIRCULATION: ICD-10-CM

## 2024-09-17 DIAGNOSIS — I10 PRIMARY HYPERTENSION: Primary | ICD-10-CM

## 2024-09-17 PROCEDURE — 3074F SYST BP LT 130 MM HG: CPT | Performed by: INTERNAL MEDICINE

## 2024-09-17 PROCEDURE — 3078F DIAST BP <80 MM HG: CPT | Performed by: INTERNAL MEDICINE

## 2024-09-17 PROCEDURE — 99204 OFFICE O/P NEW MOD 45 MIN: CPT | Performed by: INTERNAL MEDICINE

## 2024-11-04 ENCOUNTER — OFFICE VISIT (OUTPATIENT)
Dept: NEUROLOGY | Facility: CLINIC | Age: 69
End: 2024-11-04
Payer: MEDICARE

## 2024-11-04 ENCOUNTER — LAB (OUTPATIENT)
Dept: LAB | Facility: HOSPITAL | Age: 69
End: 2024-11-04
Payer: MEDICARE

## 2024-11-04 VITALS
HEART RATE: 83 BPM | BODY MASS INDEX: 34.75 KG/M2 | DIASTOLIC BLOOD PRESSURE: 74 MMHG | TEMPERATURE: 97.7 F | SYSTOLIC BLOOD PRESSURE: 118 MMHG | WEIGHT: 177 LBS | HEIGHT: 60 IN | OXYGEN SATURATION: 93 %

## 2024-11-04 DIAGNOSIS — E78.5 HYPERLIPIDEMIA LDL GOAL <100: ICD-10-CM

## 2024-11-04 DIAGNOSIS — E78.5 HYPERLIPIDEMIA, UNSPECIFIED HYPERLIPIDEMIA TYPE: Primary | ICD-10-CM

## 2024-11-04 DIAGNOSIS — R09.89 HYPERDYNAMIC CIRCULATION: ICD-10-CM

## 2024-11-04 DIAGNOSIS — E78.5 HYPERLIPIDEMIA, UNSPECIFIED HYPERLIPIDEMIA TYPE: ICD-10-CM

## 2024-11-04 PROCEDURE — 36415 COLL VENOUS BLD VENIPUNCTURE: CPT

## 2024-11-04 PROCEDURE — 1159F MED LIST DOCD IN RCRD: CPT | Performed by: NURSE PRACTITIONER

## 2024-11-04 PROCEDURE — 80061 LIPID PANEL: CPT

## 2024-11-04 PROCEDURE — 3074F SYST BP LT 130 MM HG: CPT | Performed by: NURSE PRACTITIONER

## 2024-11-04 PROCEDURE — 3078F DIAST BP <80 MM HG: CPT | Performed by: NURSE PRACTITIONER

## 2024-11-04 PROCEDURE — 1160F RVW MEDS BY RX/DR IN RCRD: CPT | Performed by: NURSE PRACTITIONER

## 2024-11-04 PROCEDURE — 99214 OFFICE O/P EST MOD 30 MIN: CPT | Performed by: NURSE PRACTITIONER

## 2024-11-04 NOTE — PATIENT INSTRUCTIONS
-Continue 81 mg of aspirin   -Continue 40 mg of Atorvastatin, once daily.    -Do not take aspirin as an analgesic/pain relief while taking a daily low-dose aspirin for primary stroke prevention.    -Consider taking ibuprofen or Tylenol, if appropriate.    -Consider contacting your primary care physician regarding pain.  -Exercise 30 minutes 3 - 4 times a day  -Heart and Brain Healthy diet, Consider a Mediterranean Diet.   -Continue tight control of blood sugars  -Journal BP at home and call PCP with persistent BP >140/90  -Drink at least 8 8oz glasses of water per day  -Follow up in 6 months.

## 2024-11-04 NOTE — PROGRESS NOTES
Stroke Clinic Office Visit     Encounter Date: 2024   Patient Name: Lizbet Flores  : 1955  MRN: [id number]   PCP: Sonny Guzman MD  Referring Provider: Rosalinda Phillips*     Chief Complaint Follow-up    History of Present Illness  Lizbet Flores is a 69 y.o. right handed female here for TIA 10/2024 follow up     History of Present Illness  The patient is a 76-year-old female here for a stroke clinic follow-up for a transient episode of bilateral blurred vision,   She experienced a transient episode of blurred vision lasting approximately 30 minutes, which led to her visit to the emergency department.   The patient underwent a stroke work up at [facility] ED, which was negative for AIS.  Following her discharge, she consulted an optometrist who conducted some tests and recommended a follow-up appointment with an ophthalmologist. She is seeing her cardiologist and PCP for close monitoring of stroke risk factors.    She reports no unusual or prolonged bleeding while on blood thinners. She does not monitor her blood pressure at home but engages in light chair exercises. Her current medications include atorvastatin 40 mg and aspirin 81 mg. She has not had her cholesterol levels checked recently. She does not experience any muscle fatigue, weakness, or spasms from taking atorvastatin.  She is prediabetic and maintains good hydration. She exercises daily and does not use a walker. She reports no falls.    Patient denies any other neurological symptoms, including: headache, vision changes, dysesthesias, loss of consciousness, seizure or new areas of motor weakness.     Subjective     Past Medical History:   Diagnosis Date    Diabetes mellitus     Hypertension     Tremor       History reviewed. No pertinent surgical history.  No family history on file.   Social History     Socioeconomic History    Marital status:    Tobacco Use    Smoking status: Never     Passive exposure: Never  "   Smokeless tobacco: Never   Vaping Use    Vaping status: Never Used   Substance and Sexual Activity    Alcohol use: Never    Drug use: Never    Sexual activity: Defer       Current Outpatient Medications:     aspirin 81 MG chewable tablet, Chew & swallow 1 tablet by mouth Daily., Disp: 90 tablet, Rfl: 0    atorvastatin (LIPITOR) 40 MG tablet, Take 1 tablet by mouth Every Night., Disp: 90 tablet, Rfl: 0    B Complex Vitamins (VITAMIN-B COMPLEX PO), Take 1 tablet by mouth Daily., Disp: , Rfl:     carbidopa-levodopa (SINEMET)  MG per tablet, Take 1 tablet by mouth 3 (Three) Times a Day., Disp: , Rfl:     lisinopril (PRINIVIL,ZESTRIL) 20 MG tablet, Take 1 tablet by mouth Daily for 30 days., Disp: 30 tablet, Rfl: 0    metFORMIN (GLUCOPHAGE) 500 MG tablet, Take 1 tablet by mouth Daily With Breakfast., Disp: , Rfl:     metoprolol succinate XL (Toprol XL) 25 MG 24 hr tablet, Take 1 tablet by mouth Daily. (Patient not taking: Reported on 11/4/2024), Disp: 30 tablet, Rfl: 0   No Known Allergies     Objective     Physical Exam:  Vitals:    11/04/24 1255   BP: 118/74   Pulse: 83   Temp: 97.7 °F (36.5 °C)   SpO2: 93%   Weight: 80.3 kg (177 lb)   Height: 152 cm (59.84\")      Body mass index is 34.75 kg/m².     Physical Exam:  General Appearance: Alert  Eyes: Anicteric sclera  HEENT: no scleral injection   Lungs: respirations appear comfortable, no obvious increased work of breathing  Extremities: No cyanosis or fingernail clubbing   Skin: No rashes in exposed skin areas     Neurological Examination:   Mental status: Alert and oriented to person, place, and time. Speech with no dysarthria, able to name and repeat with no difficulty.    Cranial Nerves: Visual fields intact. Extraocular movements are intact with no nystagmus. Facial sensation intact. Face symmetrical. Hearing grossly intact. Palate movement is symmetric. Full shoulder shrug bilaterally. Tongue protrudes midline.   Sensory: Sensory exam to light touch in all " four extremities distally is normal. Double simultaneous sensory stimulation shows no extinction  Motor: Normal tone throughout. Normal bulk. Pronator drift is absent.  Left upper extremity: 5/5 deltoid, tricep, bicep, interosseous, hand .   Right upper extremity: 5/5 deltoid, tricep, bicep, interosseous, hand .   Left lower extremity: 5/5 iliopsoas, knee extension/flexion, foot dorsi/plantarflexion.  Right lower extremity: 5/5 iliopsoas, knee extension/flexion, foot dorsi/plantarflexion.  Cerebellar: Finger-to-nose intact. Heel-to-shin intact. Rapid alternating movements are intact.   Gait: Normal.    NIHSS:     1a  Level of consciousness: 0=alert; keenly responsive   1b. LOC questions:  0=Performs both tasks correctly   1c. LOC commands: 0=Answers both questions correctly   2.  Best Gaze: 0=normal   3.  Visual: 0=No visual loss   4. Facial Palsy: 0=Normal symmetric movement   5a.  Motor left arm: 0=No drift, limb holds 90 (or 45) degrees for full 10 seconds   5b.  Motor right arm: 0=No drift, limb holds 90 (or 45) degrees for full 10 seconds   6a. motor left le=No drift, limb holds 90 (or 45) degrees for full 10 seconds   6b  Motor right le=No drift, limb holds 90 (or 45) degrees for full 10 seconds   7. Limb Ataxia: 0=Absent   8.  Sensory: 0=Normal; no sensory loss   9. Best Language:  0=No aphasia, normal   10. Dysarthria: 0=Normal   11. Extinction and Inattention: 0=No abnormality     Modified [place] Score based on in-office assessment of alertness, orientation, and physical mobility. Further assessment with neurocognitive testing may be needed to elucidate full extent of cognitive abilities: 0 = No Symptoms    Over the past month…    Snoring:   Do you snore loudly (loud enough to be heard through closed doors or your bed partner elbows you for snoring at night)?  no    Tired:   Do you often feel tired, fatigued or sleepy during the daytime (such as falling asleep during driving or talking to  someone)?  no    Observed:   Has anyone observed you stop breathing or choking/gasping during your sleep?  no    Pressure:   Do you have or are you being treated for high blood pressure?  yes    Body Mass Index:   Is the BMI > 35kg/m2 ?   BMI = Body mass index is 34.75 kg/m².  no    Age:   Older than 50?  yes    Neck Size:   Is your shirt collar > 16 inches/40 cm or larger? (measured around Heath apple).   Neck Size =  no      Gender:   Male/Female?  no    Total Score: 2    0-2 = OMAR Low Risk    3-4 = OMAR Intermediate Risk    5-8 = OMAR High Risk  Or >2 + male gender  Or >2 + BMI > 35kg/m2  Or > 2 + neck circumference 16 inches / 40cm       PHQ-9 Depression Screening  Little interest or pleasure in doing things? Not at all   Feeling down, depressed, or hopeless? Not at all   PHQ-2 Total Score 0   Trouble falling or staying asleep, or sleeping too much?     Feeling tired or having little energy?     Poor appetite or overeating?     Feeling bad about yourself - or that you are a failure or have let yourself or your family down?     Trouble concentrating on things, such as reading the newspaper or watching television?     Moving or speaking so slowly that other people could have noticed? Or the opposite - being so fidgety or restless that you have been moving around a lot more than usual?     Thoughts that you would be better off dead, or of hurting yourself in some way?     PHQ-9 Total Score     If you checked off any problems, how difficult have these problems made it for you to do your work, take care of things at home, or get along with other people?          ZORA Fall Risk Clinician Key Questions   Have you fallen in the past year?: No  Do you feel unsteady with walking?: No  Are you worried about falling?: No  Stay Idependant Patient Questions   Patient Fall Risk Assessment Score : 0  Fall Risk Category  Fall Risk Category: Moderate    Imaging Reviewed:   MRI BRAIN WO CONTRAST     Date of Exam: 9/13/2024 1:30 PM  EDT     Indication: dizziness, blurred vision, right arm numbness.     Comparison: CTs same day     Technique:  Routine multiplanar/multisequence sequence images of the brain were obtained without contrast administration.        Findings:  Diffusion weighted imaging demonstrates no acute restriction abnormality.       Laboratory Results:   Hemoglobin   Date Value Ref Range Status   09/14/2024 10.8 (L) 12.0 - 15.9 g/dL Final     Hematocrit   Date Value Ref Range Status   09/14/2024 31.7 (L) 34.0 - 46.6 % Final     Platelets   Date Value Ref Range Status   09/14/2024 290 140 - 450 10*3/mm3 Final     Hemoglobin A1C   Date Value Ref Range Status   09/14/2024 6.70 (H) 4.80 - 5.60 % Final     AST (SGOT)   Date Value Ref Range Status   09/13/2024 18 1 - 32 U/L Final     ALT (SGPT)   Date Value Ref Range Status   09/13/2024 7 1 - 33 U/L Final           Assessment / Plan      Assessment and Plan    Assessment & Plan  1. Transient Ischemic Attack.  The patient experienced a transient ischemic attack with symptoms of blurred vision lasting approximately 30 minutes. The work-up in the emergency department was negative for stroke. She has been prescribed aspirin 81 mg and atorvastatin 40 mg, which she will continue. There have been no reports of unusual bleeding or muscle fatigue from the medications. A lipid level test has been ordered to monitor cholesterol levels. She is advised to monitor her blood pressure at home and follow a Mediterranean diet. Exercise is recommended at least three times a week for 30 minutes.     2. Tremor.  The patient has been experiencing tremors, which have improved with Sinemet. She has not been diagnosed with Parkinson's disease but is using Sinemet for symptom management.    3. Prediabetes.  The patient reports being prediabetic. Monitoring of blood sugar levels and maintaining a healthy diet is recommended.    Follow-up  Return in 3 months for follow-up.    Diagnoses and all orders for this  "visit:    1. Hyperlipidemia, unspecified hyperlipidemia type (Primary)  -     Lipid Panel; Future    2. Hyperdynamic circulation    3. Hyperlipidemia LDL goal < 70      -Continue 81 mg of aspirin   -Continue 40 mg of Atorvastatin  -Exercise 30 minutes 3 - 4 times a day  -Heart and Brain Healthy diet, Consider a Mediterranean Diet.   -Journal BP at home and call PCP with persistent BP >140/90  -Follow up in 3  -The patient was advised to follow up with her primary care physician for ongoing management and screening for hypertension, hypercholesterolemia, and diabetes.   -The patient was counseled on long-term blood pressure goal less than 120/80, long-term LDL goal less than 70, and long-term hemoglobin A1c goal less than 7.0% for stroke prevention. This was also printed for the patient in the after visit summary.  -The patient was counseled she experiences symptoms of acute onset unilateral arm, leg, or face weakness/numbness/tingling, unilateral facial droop, slurred speech/word finding difficulty, visual disturbance (\"curtain falling\" or visual field loss), or severe headache she should call 911 and present to the nearest emergency department immediately.    I spent 30 minutes caring for Lizbet on this date of service. This time includes time spent by me in the following activities:reviewing tests, performing a medically appropriate examination and/or evaluation , counseling and educating the patient/family/caregiver, ordering medications, tests, or procedures, and documenting information in the medical record    Follow Up  No follow-ups on file.    Patient or patient representative verbalized consent for the use of Ambient Listening during the visit with  MADDY Garcia for chart documentation. 11/4/2024  14:02 EST    11/4/2024  13:48 EST    MADDY Garcia  [facility] NEURO STROKE     Part of this note may be an electronic transcription/translation of spoken language to printed text using the Dragon " Dictation System.

## 2024-11-05 ENCOUNTER — OFFICE VISIT (OUTPATIENT)
Dept: CARDIOLOGY | Facility: CLINIC | Age: 69
End: 2024-11-05
Payer: MEDICARE

## 2024-11-05 ENCOUNTER — HOSPITAL ENCOUNTER (OUTPATIENT)
Dept: CARDIOLOGY | Facility: HOSPITAL | Age: 69
Discharge: HOME | End: 2024-11-05
Admitting: INTERNAL MEDICINE
Payer: MEDICARE

## 2024-11-05 VITALS
DIASTOLIC BLOOD PRESSURE: 74 MMHG | HEART RATE: 89 BPM | OXYGEN SATURATION: 95 % | BODY MASS INDEX: 34.75 KG/M2 | HEIGHT: 60 IN | SYSTOLIC BLOOD PRESSURE: 112 MMHG | WEIGHT: 177 LBS

## 2024-11-05 VITALS
DIASTOLIC BLOOD PRESSURE: 84 MMHG | HEIGHT: 60 IN | SYSTOLIC BLOOD PRESSURE: 127 MMHG | BODY MASS INDEX: 34.76 KG/M2 | WEIGHT: 177.03 LBS

## 2024-11-05 DIAGNOSIS — R55 PRE-SYNCOPE: ICD-10-CM

## 2024-11-05 DIAGNOSIS — I10 PRIMARY HYPERTENSION: ICD-10-CM

## 2024-11-05 DIAGNOSIS — R55 SYNCOPE AND COLLAPSE: ICD-10-CM

## 2024-11-05 DIAGNOSIS — R09.89 HYPERDYNAMIC CIRCULATION: Primary | ICD-10-CM

## 2024-11-05 LAB
BH CV ECHO MEAS - EF(MOD-BP): 67.6 %
BH CV ECHO MEAS - IVS/LVPW: 1 CM
BH CV ECHO MEAS - IVSD: 1.1 CM
BH CV ECHO MEAS - LA DIMENSION: 3.3 CM
BH CV ECHO MEAS - LVIDD: 3.1 CM
BH CV ECHO MEAS - LVIDS: 1.6 CM
BH CV ECHO MEAS - LVPWD: 1.1 CM
CHOLEST SERPL-MCNC: 129 MG/DL (ref 0–200)
HDLC SERPL-MCNC: 42 MG/DL (ref 40–60)
LDLC SERPL CALC-MCNC: 76 MG/DL (ref 0–100)
LDLC/HDLC SERPL: 1.83 {RATIO}
LEFT ATRIUM VOLUME INDEX: 32.3 ML/M2
TRIGL SERPL-MCNC: 50 MG/DL (ref 0–150)
VLDLC SERPL-MCNC: 11 MG/DL (ref 5–40)

## 2024-11-05 PROCEDURE — 99214 OFFICE O/P EST MOD 30 MIN: CPT | Performed by: INTERNAL MEDICINE

## 2024-11-05 PROCEDURE — 93321 DOPPLER ECHO F-UP/LMTD STD: CPT

## 2024-11-05 PROCEDURE — 93308 TTE F-UP OR LMTD: CPT | Performed by: INTERNAL MEDICINE

## 2024-11-05 PROCEDURE — 3078F DIAST BP <80 MM HG: CPT | Performed by: INTERNAL MEDICINE

## 2024-11-05 PROCEDURE — 93308 TTE F-UP OR LMTD: CPT

## 2024-11-05 PROCEDURE — 1159F MED LIST DOCD IN RCRD: CPT | Performed by: INTERNAL MEDICINE

## 2024-11-05 PROCEDURE — 3074F SYST BP LT 130 MM HG: CPT | Performed by: INTERNAL MEDICINE

## 2024-11-05 PROCEDURE — 93325 DOPPLER ECHO COLOR FLOW MAPG: CPT

## 2024-11-05 PROCEDURE — 93325 DOPPLER ECHO COLOR FLOW MAPG: CPT | Performed by: INTERNAL MEDICINE

## 2024-11-05 PROCEDURE — 93321 DOPPLER ECHO F-UP/LMTD STD: CPT | Performed by: INTERNAL MEDICINE

## 2024-11-05 PROCEDURE — 1160F RVW MEDS BY RX/DR IN RCRD: CPT | Performed by: INTERNAL MEDICINE

## 2024-11-05 RX ORDER — METOPROLOL SUCCINATE 25 MG/1
25 TABLET, EXTENDED RELEASE ORAL DAILY
Qty: 30 TABLET | Refills: 11 | Status: SHIPPED | OUTPATIENT
Start: 2024-11-05

## 2024-11-05 NOTE — PROGRESS NOTES
[facility] Cardiology  Office Note  Lizbet Flores  1955  [address]  [facility] [address]     VISIT DATE:  11/05/24    PCP: Sonny Guzman MD  [address]  [facility] [address]    CC: Presyncope  Chief Complaint   Patient presents with    Primary hypertension       PROBLEM LIST:    Echocardiogram September 2024 with mild aortic and tricuspid insufficiency hyperdynamic left ventricular function and increase of LVOT gradient from 37-62 with Valsalva  Presyncope  Echocardiogram November 2024 with minimal LVOT gradient    Allergies  No Known Allergies    Current Medications    Current Outpatient Medications:     aspirin 81 MG chewable tablet, Chew & swallow 1 tablet by mouth Daily., Disp: 90 tablet, Rfl: 0    atorvastatin (LIPITOR) 40 MG tablet, Take 1 tablet by mouth Every Night., Disp: 90 tablet, Rfl: 0    B Complex Vitamins (VITAMIN-B COMPLEX PO), Take 1 tablet by mouth Daily., Disp: , Rfl:     carbidopa-levodopa (SINEMET)  MG per tablet, Take 1 tablet by mouth 3 (Three) Times a Day., Disp: , Rfl:     metFORMIN (GLUCOPHAGE) 500 MG tablet, Take 1 tablet by mouth Daily With Breakfast., Disp: , Rfl:     metoprolol succinate XL (Toprol XL) 25 MG 24 hr tablet, Take 1 tablet by mouth Daily., Disp: 30 tablet, Rfl: 11    lisinopril (PRINIVIL,ZESTRIL) 20 MG tablet, Take 1 tablet by mouth Daily for 30 days., Disp: 30 tablet, Rfl: 0     History of Present Illness   Lizbet Flores is a 69 y.o. year old female who presents for consult from No ref. provider found for evaluation of presyncope.  Patient's been feeling well overall.  No chest pain shortness of breath.  No lower extremity swelling.  Patient has not been checking her blood pressure at home.  Has had no real issues as she had before.  No dizziness or lightheadedness.  Patient generally feels well.  Has been taking her beta-blocker.  Patient otherwise is without complaints today.  Has had no palpitations  "either.    Pt denies any chest pain, dyspnea at rest, dyspnea on exertion, orthopnea, PND, palpitations, lower extremity edema, or claudication. Pt denies history of CHF, DVT, PE, MI, CVA, TIA, or rheumatic fever.     SOCIAL HX  Social History     Socioeconomic History    Marital status:    Tobacco Use    Smoking status: Never     Passive exposure: Never    Smokeless tobacco: Never   Vaping Use    Vaping status: Never Used   Substance and Sexual Activity    Alcohol use: Never    Drug use: Never    Sexual activity: Defer       FAMILY HX  History reviewed. No pertinent family history.    Vitals:    11/05/24 1131   BP: 112/74   BP Location: Right arm   Patient Position: Sitting   Pulse: 89   SpO2: 95%   Weight: 80.3 kg (177 lb)   Height: 152.4 cm (60\")     Body mass index is 34.57 kg/m².     PHYSICAL EXAMINATION:  Vitals and nursing note reviewed.   Constitutional:       Appearance: Healthy appearance. Not in distress.   Eyes:      Conjunctiva/sclera: Conjunctivae normal.      Pupils: Pupils are equal, round, and reactive to light.   HENT:      Nose: Nose normal.    Mouth/Throat:      Pharynx: Oropharynx is clear.   Neck:      Vascular: JVD normal.   Pulmonary:      Effort: Pulmonary effort is normal.      Breath sounds: Normal breath sounds. No wheezing. No rhonchi. No rales.   Cardiovascular:      PMI at left midclavicular line. Normal rate. Regular rhythm. Normal S1. Normal S2.       Murmurs: There is no murmur.      No gallop.  No click. No rub.   Pulses:     Intact distal pulses.   Abdominal:      General: Bowel sounds are normal.      Palpations: Abdomen is soft.      Tenderness: There is no abdominal tenderness.   Musculoskeletal: Normal range of motion.         General: No tenderness.      Cervical back: Normal range of motion. Skin:     General: Skin is warm and dry.   Neurological:      General: No focal deficit present.      Mental Status: Alert and oriented to person, place and time.      Cranial " Nerves: Cranial nerves 2-12 are intact.         Diagnostic Data:  Lab Results   Component Value Date    TRIG 50 11/04/2024    HDL 42 11/04/2024     Lab Results   Component Value Date    GLUCOSE 137 (H) 09/14/2024    BUN 17 09/14/2024    CREATININE 0.98 09/14/2024     09/14/2024    K 4.8 09/14/2024     09/14/2024    CO2 28.0 09/14/2024     Lab Results   Component Value Date    HGBA1C 6.70 (H) 09/14/2024     Lab Results   Component Value Date    WBC 6.15 09/14/2024    HGB 10.8 (L) 09/14/2024    HCT 31.7 (L) 09/14/2024     09/14/2024       Procedures    Advance Care Planning   ACP discussion was declined by the patient. Patient does not have an advance directive, declines further assistance.         ASSESSMENT:  Diagnoses and all orders for this visit:    1. Hyperdynamic circulation (Primary)    2. Primary hypertension    3. Syncope and collapse    Other orders  -     metoprolol succinate XL (Toprol XL) 25 MG 24 hr tablet; Take 1 tablet by mouth Daily.  Dispense: 30 tablet; Refill: 11        PLAN:    Continue current care at this time.  Echo improved.  Patient will be continued on her metoprolol.  Patient can get blood from my perspective.  Can also exercise.  Need to check blood pressure at home consistently was discussed  Maintain adequate fluid status was discussed    Return in about 6 months (around 5/5/2025).     Stanley Heath MD

## 2025-05-06 ENCOUNTER — OFFICE VISIT (OUTPATIENT)
Dept: NEUROLOGY | Facility: CLINIC | Age: 70
End: 2025-05-06
Payer: MEDICARE

## 2025-05-06 ENCOUNTER — OFFICE VISIT (OUTPATIENT)
Dept: CARDIOLOGY | Facility: CLINIC | Age: 70
End: 2025-05-06
Payer: MEDICARE

## 2025-05-06 VITALS
HEART RATE: 73 BPM | BODY MASS INDEX: 35.73 KG/M2 | SYSTOLIC BLOOD PRESSURE: 138 MMHG | OXYGEN SATURATION: 98 % | DIASTOLIC BLOOD PRESSURE: 70 MMHG | WEIGHT: 182 LBS | HEIGHT: 60 IN

## 2025-05-06 VITALS
OXYGEN SATURATION: 97 % | HEART RATE: 77 BPM | SYSTOLIC BLOOD PRESSURE: 122 MMHG | TEMPERATURE: 98.4 F | HEIGHT: 60 IN | BODY MASS INDEX: 35.73 KG/M2 | WEIGHT: 182 LBS | DIASTOLIC BLOOD PRESSURE: 70 MMHG

## 2025-05-06 DIAGNOSIS — I10 PRIMARY HYPERTENSION: Chronic | ICD-10-CM

## 2025-05-06 DIAGNOSIS — R29.90 EPISODE OF TRANSIENT NEUROLOGIC SYMPTOMS: Primary | ICD-10-CM

## 2025-05-06 DIAGNOSIS — I10 HYPERTENSION, UNSPECIFIED TYPE: ICD-10-CM

## 2025-05-06 DIAGNOSIS — E78.5 HYPERLIPIDEMIA LDL GOAL <70: ICD-10-CM

## 2025-05-06 DIAGNOSIS — E11.65 TYPE 2 DIABETES MELLITUS WITH HYPERGLYCEMIA, WITHOUT LONG-TERM CURRENT USE OF INSULIN: ICD-10-CM

## 2025-05-06 DIAGNOSIS — Z86.73 HISTORY OF STROKE: ICD-10-CM

## 2025-05-06 DIAGNOSIS — R55 SYNCOPE AND COLLAPSE: Primary | Chronic | ICD-10-CM

## 2025-05-06 PROCEDURE — 1159F MED LIST DOCD IN RCRD: CPT | Performed by: NURSE PRACTITIONER

## 2025-05-06 PROCEDURE — 99214 OFFICE O/P EST MOD 30 MIN: CPT | Performed by: NURSE PRACTITIONER

## 2025-05-06 PROCEDURE — 3074F SYST BP LT 130 MM HG: CPT | Performed by: NURSE PRACTITIONER

## 2025-05-06 PROCEDURE — 3078F DIAST BP <80 MM HG: CPT | Performed by: NURSE PRACTITIONER

## 2025-05-06 PROCEDURE — 3075F SYST BP GE 130 - 139MM HG: CPT | Performed by: INTERNAL MEDICINE

## 2025-05-06 PROCEDURE — 3078F DIAST BP <80 MM HG: CPT | Performed by: INTERNAL MEDICINE

## 2025-05-06 PROCEDURE — 93000 ELECTROCARDIOGRAM COMPLETE: CPT | Performed by: INTERNAL MEDICINE

## 2025-05-06 PROCEDURE — 1160F RVW MEDS BY RX/DR IN RCRD: CPT | Performed by: NURSE PRACTITIONER

## 2025-05-06 PROCEDURE — 99214 OFFICE O/P EST MOD 30 MIN: CPT | Performed by: INTERNAL MEDICINE

## 2025-05-06 NOTE — ASSESSMENT & PLAN NOTE
Previously attributed to hyperdynamic LV and cavitary obliteration with dehydration  Personally reviewed her echocardiograms and there is no evidence of hypertrophic cardiomyopathy.  Changes are probably more reflective hypertensive heart disease.  Repeat echo shows no obliteration or LVOT gradient.  Exam is normal.  Repeat ECG shows possible Q waves in V1/V2 but I think it is artifactual due to lead placement.  Patient has no symptoms to warrant further testing at this time  Continue good hydration and maintenance of her blood pressure medications  Orders:    ECG 12 Lead

## 2025-05-06 NOTE — PROGRESS NOTES
Follow Up Office Visit      Encounter Date: 2025   Patient Name: Lizbet Flores  : 1955   MRN: [id number]   PCP: Sonny Guzman MD    Referring Provider: No ref. provider found     Chief Complaint:    Chief Complaint   Patient presents with    Follow-up       History of Present Illness: Lizbet Flores is a 70 y.o. female who is here today to follow up with TIA.    Clinic visit with Darcy CASTAÑEDA 2024:The patient is a 76-year-old female here for a stroke clinic follow-up for a transient episode of bilateral blurred vision,   She experienced a transient episode of blurred vision lasting approximately 30 minutes, which led to her visit to the emergency department.   The patient underwent a stroke work up at [facility] ED, which was negative for AIS.  Following her discharge, she consulted an optometrist who conducted some tests and recommended a follow-up appointment with an ophthalmologist. She is seeing her cardiologist and PCP for close monitoring of stroke risk factors.  She reports no unusual or prolonged bleeding while on blood thinners. She does not monitor her blood pressure at home but engages in light chair exercises. Her current medications include atorvastatin 40 mg and aspirin 81 mg. She has not had her cholesterol levels checked recently. She does not experience any muscle fatigue, weakness, or spasms from taking atorvastatin.  She is prediabetic and maintains good hydration. She exercises daily and does not use a walker. She reports no falls.Patient denies any other neurological symptoms, including: headache, vision changes, dysesthesias, loss of consciousness, seizure or new areas of motor weakness.     Clinic visit 2025: Ms. Flores initially presented to [facility] in 2024 with complaints of dizziness and transient blurred vision.  Advanced imaging was unremarkable.  Symptoms were felt to be likely due to dehydration or medication side effect.  TIA was  unable to be ruled out.  She was recommended to continue aspirin 81 mg as well as high-dose statin.  Presents to clinic today with her daughter.  She has had no new strokelike symptoms.  She is currently working as an .  She reports that she is active.  She does not regularly check her blood pressure.  It is 122/70 today.  She reports compliance with her aspirin and atorvastatin.  She does report intermittent dizziness with standing.  Discussed the importance of staying well-hydrated and changing positions slowly.  Recommend that she check her blood pressure routinely at home.  She verbalized understanding.  She does walk with a slight limp.  She reports this is baseline for her.  She does not use an assistive device.      Subjective        I have reviewed and the following portions of the patient's history were updated as appropriate: past family history, past medical history, past social history, past surgical history and problem list.    Medications:     Current Outpatient Medications:     aspirin 81 MG chewable tablet, Chew & swallow 1 tablet by mouth Daily., Disp: 90 tablet, Rfl: 0    atorvastatin (LIPITOR) 40 MG tablet, Take 1 tablet by mouth Every Night., Disp: 90 tablet, Rfl: 0    B Complex Vitamins (VITAMIN-B COMPLEX PO), Take 1 tablet by mouth Daily., Disp: , Rfl:     carbidopa-levodopa (SINEMET)  MG per tablet, Take 1 tablet by mouth 3 (Three) Times a Day., Disp: , Rfl:     lisinopril (PRINIVIL,ZESTRIL) 20 MG tablet, Take 1 tablet by mouth Daily for 30 days., Disp: 30 tablet, Rfl: 0    metFORMIN (GLUCOPHAGE) 500 MG tablet, Take 1 tablet by mouth Daily With Breakfast., Disp: , Rfl:     metoprolol succinate XL (Toprol XL) 25 MG 24 hr tablet, Take 1 tablet by mouth Daily., Disp: 30 tablet, Rfl: 11    Allergies:   No Known Allergies    Objective     Physical Exam:   Vital Signs:   Vitals:    05/06/25 1302   BP: 122/70   Pulse: 77   Temp: 98.4 °F (36.9 °C)   SpO2: 97%   Weight: 82.6 kg  "(182 lb)   Height: 152.4 cm (60\")     Body mass index is 35.54 kg/m².    Physical Exam  Constitutional:       General: She is not in acute distress.     Appearance: She is obese.   HENT:      Head: Normocephalic and atraumatic.   Eyes:      Extraocular Movements: Extraocular movements intact.      Pupils: Pupils are equal, round, and reactive to light.   Cardiovascular:      Rate and Rhythm: Normal rate and regular rhythm.   Pulmonary:      Effort: Pulmonary effort is normal. No respiratory distress.   Musculoskeletal:         General: Normal range of motion.      Cervical back: Normal range of motion and neck supple.   Skin:     General: Skin is warm and dry.      Capillary Refill: Capillary refill takes less than 2 seconds.   Neurological:      Mental Status: She is alert and oriented to person, place, and time.      Cranial Nerves: No cranial nerve deficit.      Sensory: No sensory deficit.      Motor: Motor strength is normal.No weakness.      Coordination: Coordination normal.      Gait: Gait abnormal.   Psychiatric:         Speech: Speech normal.       Neurological Exam  Mental Status  Alert. Oriented to person, place, time and situation. Oriented to person, place, and time. Speech is normal. Language is fluent with no aphasia.    Cranial Nerves  CN II: Visual acuity is normal. Visual fields full to confrontation.  CN III, IV, VI: Extraocular movements intact bilaterally. Pupils equal round and reactive to light bilaterally.  CN V: Facial sensation is normal.  CN VII: Full and symmetric facial movement.  CN IX, X: Palate elevates symmetrically  CN XI: Shoulder shrug strength is normal.  CN XII: Tongue midline without atrophy or fasciculations.    Motor   Strength is 5/5 throughout all four extremities.    Sensory  Light touch is normal in upper and lower extremities.     Coordination  Right: Finger-to-nose normal. Rapid alternating movement normal.Left: Finger-to-nose normal. Rapid alternating movement " normal.    Gait   Abnormal gait.  Intermittent limping with her right foot, baseline per patient.       Lipid Panel          11/4/2024    14:24   Lipid Panel   Total Cholesterol 129    Triglycerides 50    HDL Cholesterol 42    VLDL Cholesterol 11    LDL Cholesterol  76    LDL/HDL Ratio 1.83            Assessment / Plan      Assessment/Plan:   Diagnoses and all orders for this visit:    1. Episode of transient neurologic symptoms (Primary)  - Continue aspirin 81 mg daily  - Continue atorvastatin 40 mg daily  - BP goals less than 130/80.  Please check daily and keep a log for primary care provider  - Serum glucose goals less than 140.  Hemoglobin A1c goal is less than 7  - Stay well-hydrated  - Change positions slowly  - Increase activity as tolerated  - Fall precautions  - Return to the ED with any additional strokelike symptoms    2. Hypertension, unspecified type  - BP goals less than 130/80  - Further management per primary care provider    3. Type 2 diabetes mellitus with hyperglycemia, without long-term current use of insulin  - Serum glucose goals less than 140.  Hemoglobin A1c goal is less than 7  - Further management per primary care provider    4. Hyperlipidemia LDL goal <70  -LDL 76 on 11/4/2024  - Continue atorvastatin 40 mg daily  - Further management per primary care provider       Follow Up:   Return in about 6 months (around 11/6/2025).    Discussed the importance of medication compliance Aspirin 81mg daily and Atorvastatin 40mg nightly and lifestyle modifications adequate control of blood pressure, adequate control of cholesterol (goal LDL <70), adequate control of glucose (<140, A1c goal <7), increased physical activity, and implementation of healthy diet to help reduce the risk of future cerebrovascular events.  Also discussed the signs symptoms that would warrant the patient return back to the emergency department including unilateral weakness, unilateral numbness, visual disturbances, loss of  balance, speech difficulties, and/or a sudden severe headache.  Patient and her daughter verbalized understanding understanding.      MADDY Templeton, [facility] Stroke

## 2025-05-06 NOTE — PATIENT INSTRUCTIONS
- Continue aspirin 81 mg daily  - Continue atorvastatin 40 mg daily  - BP goals less than 130/80.  Please check daily and keep a log for primary care provider  - Serum glucose goals less than 140.  Hemoglobin A1c goal is less than 7  - Stay well-hydrated  - Change positions slowly  - Increase activity as tolerated  - Fall precautions  - Return to the ED with any additional strokelike symptoms

## 2025-05-06 NOTE — PROGRESS NOTES
[facility] Office Follow Up Note    Lizbet Flores  [id number]  2025    Primary Care Provider: Sonny Guzman MD    Chief Complaint   Patient presents with    Hyperdynamic circulation       Subjective     History of Present Illness:   Lizbet Flores is a 70 y.o. female with hypertension, hyperlipidemia, diabetes, history of stroke on brain imaging and syncope who presents to the Cardiology Clinic for regular follow-up.  Patient was last seen by Dr. Heath.  No changes were made to her medications.  Patient says she is feeling well.  Accompanied by her daughter.  Denies any chest pain or dyspnea.  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  She feels no limitation with that type of activity.  She does some stationary exercises and has no limitations with that.  Her blood pressure in clinic today is a bit higher than normal.  Denies any dizziness or lightheadedness.  No repeat syncope.  Does have some intermittent leg edema but that usually recedes at the end of the day.      Past Cardiac History and Testin.  History of syncope  --Echo 2024: EF greater than 70%.  Near cavity obliteration at rest.  Resting LVOT gradient 27, 62 with Valsalva.  Borderline LVH.  Bubble negative.  Trace to mild AR/TR.  --Echo 2024: EF 66-70%.  Borderline LVH.  2.  Hypertension  3.  Diabetes  4.  History of stroke on MRI (pontine)?    Review of Systems:  Review of Systems   Constitutional: Negative.    Respiratory: Negative.     Cardiovascular: Negative.    Gastrointestinal: Negative.    Musculoskeletal: Negative.    Neurological: Negative.        I have reviewed and/or updated the patient's past medical, past surgical, family, social history, problem list and allergies as appropriate.       Current Outpatient Medications:     aspirin 81 MG chewable tablet, Chew & swallow 1 tablet by mouth Daily., Disp: 90 tablet, Rfl: 0    atorvastatin  "(LIPITOR) 40 MG tablet, Take 1 tablet by mouth Every Night., Disp: 90 tablet, Rfl: 0    B Complex Vitamins (VITAMIN-B COMPLEX PO), Take 1 tablet by mouth Daily., Disp: , Rfl:     carbidopa-levodopa (SINEMET)  MG per tablet, Take 1 tablet by mouth 3 (Three) Times a Day., Disp: , Rfl:     lisinopril (PRINIVIL,ZESTRIL) 20 MG tablet, Take 1 tablet by mouth Daily for 30 days., Disp: 30 tablet, Rfl: 0    metFORMIN (GLUCOPHAGE) 500 MG tablet, Take 1 tablet by mouth Daily With Breakfast., Disp: , Rfl:     metoprolol succinate XL (Toprol XL) 25 MG 24 hr tablet, Take 1 tablet by mouth Daily., Disp: 30 tablet, Rfl: 11       Objective     Vitals:  Vitals:    05/06/25 1402   BP: 138/70   BP Location: Right arm   Patient Position: Sitting   Cuff Size: Adult   Pulse: 73   SpO2: 98%   Weight: 82.6 kg (182 lb)   Height: 152.4 cm (60\")       Physical Exam:  Vitals reviewed.   Constitutional:       Appearance: Healthy appearance. Not in distress.   Neck:      Vascular: No JVD.   Pulmonary:      Effort: Pulmonary effort is normal.      Breath sounds: Normal breath sounds.   Cardiovascular:      Normal rate. Regular rhythm. Normal S1. Normal S2.       Murmurs: There is no murmur.      No gallop.  No click. No rub.   Pulses:     Intact distal pulses.   Edema:     Peripheral edema absent.   Abdominal:      General: There is no distension.      Palpations: Abdomen is soft.      Tenderness: There is no abdominal tenderness.   Skin:     General: Skin is warm and dry.         Results Review:   I reviewed the patient's new clinical results.  I reviewed the patient's new imaging results and agree with the interpretation.  I reviewed the patient's other test results and agree with the interpretation  I personally viewed and interpreted the patient's EKG/Telemetry data      ECG 12 Lead    Date/Time: 5/6/2025 2:26 PM  Performed by: Jacobo Marcos MD    Authorized by: Jacobo Marcos MD  Comparison: compared with previous ECG from " 9/14/2024  Similar to previous ECG  Rhythm: sinus rhythm  Rate: normal  BPM: 73  Conduction: conduction normal  Q waves: V1 and V2 (Possible anteroseptal infarct, age undetermined)    ST Segments: ST segments normal  T Waves: T waves normal  QRS axis: normal  Other: no other findings    Clinical impression: abnormal EKG          Most Recent Labs:  Hemoglobin A1c (09/14/2024 05:56)  Lipid Panel (11/04/2024 14:24)          Assessment & Plan  Syncope and collapse  Previously attributed to hyperdynamic LV and cavitary obliteration with dehydration  Personally reviewed her echocardiograms and there is no evidence of hypertrophic cardiomyopathy.  Changes are probably more reflective hypertensive heart disease.  Repeat echo shows no obliteration or LVOT gradient.  Exam is normal.  Repeat ECG shows possible Q waves in V1/V2 but I think it is artifactual due to lead placement.  Patient has no symptoms to warrant further testing at this time  Continue good hydration and maintenance of her blood pressure medications  Orders:    ECG 12 Lead    Primary hypertension  Reading borderline today.  Goal less than 130/80 in the setting of previous stroke  Continue lisinopril and metoprolol         History of stroke  Somewhat questionable history with suspected right pontine silent infarct but no clinical symptoms other than a transient blurred vision.  Could have been TIA but more likely related to the above etiology of syncope.  No new symptoms.  No palpitations.  Baseline ECG with sinus rhythm.  Previous echo with bubble study was negative.  Continue aspirin and statin  If she develops new palpitations or strokelike symptoms, may need monitor to surveil for A-fib.  Without symptoms, no strong indication for this at this time, especially with questionable stroke history.              Plan   Preventative Cardiology:   Tobacco Cessation: N/A  Obstructive Sleep Apnea Screening: Deffered    Advanced Care Planning  ACP discussion was held  with the patient during this visit. Patient does not have an advance directive, information provided.           Follow Up:   Return in about 1 year (around 5/6/2026).    Thank you for allowing me to participate in the care of your patient. Please do not hesitate to contact me with additional questions or concerns.     Electronically signed by Jacobo Marcos MD, 05/06/25, 2:36 PM EDT.

## 2025-05-06 NOTE — ASSESSMENT & PLAN NOTE
Reading borderline today.  Goal less than 130/80 in the setting of previous stroke  Continue lisinopril and metoprolol

## 2025-05-06 NOTE — ASSESSMENT & PLAN NOTE
Somewhat questionable history with suspected right pontine silent infarct but no clinical symptoms other than a transient blurred vision.  Could have been TIA but more likely related to the above etiology of syncope.  No new symptoms.  No palpitations.  Baseline ECG with sinus rhythm.  Previous echo with bubble study was negative.  Continue aspirin and statin  If she develops new palpitations or strokelike symptoms, may need monitor to surveil for A-fib.  Without symptoms, no strong indication for this at this time, especially with questionable stroke history.